# Patient Record
Sex: FEMALE | Race: WHITE | NOT HISPANIC OR LATINO | Employment: FULL TIME | URBAN - METROPOLITAN AREA
[De-identification: names, ages, dates, MRNs, and addresses within clinical notes are randomized per-mention and may not be internally consistent; named-entity substitution may affect disease eponyms.]

---

## 2017-07-16 ENCOUNTER — TRANSFERRED RECORDS (OUTPATIENT)
Dept: HEALTH INFORMATION MANAGEMENT | Facility: CLINIC | Age: 42
End: 2017-07-16

## 2017-11-22 ENCOUNTER — HOSPITAL ENCOUNTER (OUTPATIENT)
Dept: MAMMOGRAPHY | Facility: CLINIC | Age: 42
Discharge: HOME | End: 2017-11-22
Attending: OBSTETRICS & GYNECOLOGY | Admitting: OBSTETRICS & GYNECOLOGY
Payer: COMMERCIAL

## 2017-11-22 DIAGNOSIS — Z12.31 ENCOUNTER FOR SCREENING MAMMOGRAM FOR HIGH-RISK PATIENT: ICD-10-CM

## 2017-11-22 PROCEDURE — G0202 SCR MAMMO BI INCL CAD: HCPCS

## 2017-11-29 ENCOUNTER — HOSPITAL ENCOUNTER (OUTPATIENT)
Dept: ULTRASOUND IMAGING | Facility: CLINIC | Age: 42
Discharge: HOME | End: 2017-11-29
Attending: OBSTETRICS & GYNECOLOGY | Admitting: OBSTETRICS & GYNECOLOGY
Payer: COMMERCIAL

## 2017-11-29 DIAGNOSIS — R92.8 ABNORMAL MAMMOGRAM: ICD-10-CM

## 2017-11-29 PROCEDURE — 76642 ULTRASOUND BREAST LIMITED: CPT | Mod: LT

## 2018-11-27 ENCOUNTER — HOSPITAL ENCOUNTER (OUTPATIENT)
Dept: MAMMOGRAPHY | Facility: CLINIC | Age: 43
Discharge: HOME | End: 2018-11-27
Attending: OBSTETRICS & GYNECOLOGY | Admitting: OBSTETRICS & GYNECOLOGY
Payer: COMMERCIAL

## 2018-11-27 DIAGNOSIS — Z12.31 VISIT FOR SCREENING MAMMOGRAM: ICD-10-CM

## 2018-11-27 PROCEDURE — 77063 BREAST TOMOSYNTHESIS BI: CPT

## 2019-09-12 ENCOUNTER — HOSPITAL ENCOUNTER (OUTPATIENT)
Dept: MAMMOGRAPHY | Facility: CLINIC | Age: 44
Discharge: HOME | End: 2019-09-12
Attending: OBSTETRICS & GYNECOLOGY | Admitting: OBSTETRICS & GYNECOLOGY
Payer: COMMERCIAL

## 2019-09-12 ENCOUNTER — HOSPITAL ENCOUNTER (OUTPATIENT)
Dept: ULTRASOUND IMAGING | Facility: CLINIC | Age: 44
End: 2019-09-12
Attending: OBSTETRICS & GYNECOLOGY
Payer: COMMERCIAL

## 2019-09-12 DIAGNOSIS — N63.0 BREAST LUMP IN UPPER INNER QUADRANT: ICD-10-CM

## 2019-09-12 PROCEDURE — 77066 DX MAMMO INCL CAD BI: CPT

## 2019-09-12 PROCEDURE — G0279 TOMOSYNTHESIS, MAMMO: HCPCS

## 2019-09-12 PROCEDURE — 76642 ULTRASOUND BREAST LIMITED: CPT | Mod: LT

## 2019-12-15 ENCOUNTER — HEALTH MAINTENANCE LETTER (OUTPATIENT)
Age: 44
End: 2019-12-15

## 2020-12-22 ENCOUNTER — HOSPITAL ENCOUNTER (OUTPATIENT)
Dept: MAMMOGRAPHY | Facility: CLINIC | Age: 45
End: 2020-12-22
Attending: OBSTETRICS & GYNECOLOGY
Payer: COMMERCIAL

## 2020-12-22 ENCOUNTER — HOSPITAL ENCOUNTER (OUTPATIENT)
Dept: ULTRASOUND IMAGING | Facility: CLINIC | Age: 45
End: 2020-12-22
Attending: OBSTETRICS & GYNECOLOGY
Payer: COMMERCIAL

## 2020-12-22 DIAGNOSIS — N64.52 NIPPLE DISCHARGE: ICD-10-CM

## 2020-12-22 PROCEDURE — 77066 DX MAMMO INCL CAD BI: CPT

## 2020-12-22 PROCEDURE — 76642 ULTRASOUND BREAST LIMITED: CPT | Mod: RT

## 2021-01-15 ENCOUNTER — HEALTH MAINTENANCE LETTER (OUTPATIENT)
Age: 46
End: 2021-01-15

## 2021-01-19 ENCOUNTER — TRANSFERRED RECORDS (OUTPATIENT)
Dept: HEALTH INFORMATION MANAGEMENT | Facility: CLINIC | Age: 46
End: 2021-01-19

## 2021-01-19 LAB
HPV ABSTRACT: NORMAL
PAP-ABSTRACT: NORMAL

## 2021-02-10 ENCOUNTER — TELEPHONE (OUTPATIENT)
Dept: MAMMOGRAPHY | Facility: CLINIC | Age: 46
End: 2021-02-10

## 2021-02-10 ENCOUNTER — HOSPITAL ENCOUNTER (OUTPATIENT)
Dept: MRI IMAGING | Facility: CLINIC | Age: 46
Discharge: HOME | End: 2021-02-10
Attending: OBSTETRICS & GYNECOLOGY | Admitting: OBSTETRICS & GYNECOLOGY
Payer: COMMERCIAL

## 2021-02-10 DIAGNOSIS — Z11.59 ENCOUNTER FOR SCREENING FOR OTHER VIRAL DISEASES: ICD-10-CM

## 2021-02-10 DIAGNOSIS — N64.52 DISCHARGE FROM RIGHT NIPPLE: ICD-10-CM

## 2021-02-10 PROCEDURE — 255N000002 HC RX 255 OP 636: Performed by: RADIOLOGY

## 2021-02-10 PROCEDURE — 77049 MRI BREAST C-+ W/CAD BI: CPT

## 2021-02-10 PROCEDURE — A9585 GADOBUTROL INJECTION: HCPCS | Performed by: RADIOLOGY

## 2021-02-10 RX ORDER — GADOBUTROL 604.72 MG/ML
7.5 INJECTION INTRAVENOUS ONCE
Status: COMPLETED | OUTPATIENT
Start: 2021-02-10 | End: 2021-02-10

## 2021-02-10 RX ADMIN — GADOBUTROL 6 ML: 604.72 INJECTION INTRAVENOUS at 10:41

## 2021-02-10 NOTE — TELEPHONE ENCOUNTER
Called patient to go over MRI results and recommendations.  Patient has already reviewed report on [employer].   Discussed findings and recommendations to return to clinic for US and possible biopsy.  Explained if no area is seen on US at that time MRI guided biopsy would be recommended and that would then be schedule for a later date.  Patient voices understanding that she will need covid test prior.

## 2021-02-14 ENCOUNTER — HOSPITAL ENCOUNTER (OUTPATIENT)
Dept: LAB | Facility: CLINIC | Age: 46
Discharge: HOME | End: 2021-02-14
Attending: OBSTETRICS & GYNECOLOGY | Admitting: OBSTETRICS & GYNECOLOGY
Payer: COMMERCIAL

## 2021-02-14 DIAGNOSIS — Z11.59 ENCOUNTER FOR SCREENING FOR OTHER VIRAL DISEASES: ICD-10-CM

## 2021-02-14 LAB
LABORATORY COMMENT REPORT: NORMAL
SARS-COV-2 RNA RESP QL NAA+PROBE: NEGATIVE
SARS-COV-2 RNA RESP QL NAA+PROBE: NORMAL
SPECIMEN SOURCE: NORMAL
SPECIMEN SOURCE: NORMAL

## 2021-02-14 PROCEDURE — U0005 INFEC AGEN DETEC AMPLI PROBE: HCPCS | Performed by: OBSTETRICS & GYNECOLOGY

## 2021-02-14 PROCEDURE — U0003 INFECTIOUS AGENT DETECTION BY NUCLEIC ACID (DNA OR RNA); SEVERE ACUTE RESPIRATORY SYNDROME CORONAVIRUS 2 (SARS-COV-2) (CORONAVIRUS DISEASE [COVID-19]), AMPLIFIED PROBE TECHNIQUE, MAKING USE OF HIGH THROUGHPUT TECHNOLOGIES AS DESCRIBED BY CMS-2020-01-R: HCPCS | Performed by: OBSTETRICS & GYNECOLOGY

## 2021-02-17 ENCOUNTER — HOSPITAL ENCOUNTER (OUTPATIENT)
Dept: MAMMOGRAPHY | Facility: CLINIC | Age: 46
End: 2021-02-17
Attending: OBSTETRICS & GYNECOLOGY
Payer: COMMERCIAL

## 2021-02-17 ENCOUNTER — HOSPITAL ENCOUNTER (OUTPATIENT)
Dept: ULTRASOUND IMAGING | Facility: CLINIC | Age: 46
End: 2021-02-17
Attending: OBSTETRICS & GYNECOLOGY
Payer: COMMERCIAL

## 2021-02-17 ENCOUNTER — TRANSFERRED RECORDS (OUTPATIENT)
Dept: HEALTH INFORMATION MANAGEMENT | Facility: CLINIC | Age: 46
End: 2021-02-17

## 2021-02-17 DIAGNOSIS — Z12.31 VISIT FOR SCREENING MAMMOGRAM: ICD-10-CM

## 2021-02-17 PROCEDURE — 88377 M/PHMTRC ALYS ISHQUANT/SEMIQ: CPT | Mod: 26 | Performed by: PATHOLOGY

## 2021-02-17 PROCEDURE — 76642 ULTRASOUND BREAST LIMITED: CPT | Mod: RT

## 2021-02-17 PROCEDURE — 88377 M/PHMTRC ALYS ISHQUANT/SEMIQ: CPT | Mod: TC | Performed by: PATHOLOGY

## 2021-02-17 PROCEDURE — 88305 TISSUE EXAM BY PATHOLOGIST: CPT | Mod: 26 | Performed by: PATHOLOGY

## 2021-02-17 PROCEDURE — 88305 TISSUE EXAM BY PATHOLOGIST: CPT | Mod: TC | Performed by: OBSTETRICS & GYNECOLOGY

## 2021-02-17 PROCEDURE — 999N001019 HC STATISTIC H-FISH PROCESS B/S: Performed by: OBSTETRICS & GYNECOLOGY

## 2021-02-17 PROCEDURE — 999N000065 MA POST PROCEDURE RIGHT

## 2021-02-17 PROCEDURE — 88360 TUMOR IMMUNOHISTOCHEM/MANUAL: CPT | Mod: TC | Performed by: OBSTETRICS & GYNECOLOGY

## 2021-02-17 PROCEDURE — 88360 TUMOR IMMUNOHISTOCHEM/MANUAL: CPT | Mod: 26 | Performed by: PATHOLOGY

## 2021-02-17 PROCEDURE — 88342 IMHCHEM/IMCYTCHM 1ST ANTB: CPT | Mod: TC,XU | Performed by: OBSTETRICS & GYNECOLOGY

## 2021-02-17 PROCEDURE — 272N000031 US BREAST BIOPSY CORE NEEDLE RIGHT

## 2021-02-17 PROCEDURE — 999N001020 HC STATISTIC H-SEND OUTS PREP: Performed by: OBSTETRICS & GYNECOLOGY

## 2021-02-17 PROCEDURE — 250N000009 HC RX 250: Performed by: OBSTETRICS & GYNECOLOGY

## 2021-02-17 RX ADMIN — LIDOCAINE HYDROCHLORIDE 5 ML: 10 INJECTION, SOLUTION EPIDURAL; INFILTRATION; INTRACAUDAL; PERINEURAL at 10:57

## 2021-02-17 NOTE — DISCHARGE INSTRUCTIONS
Page 1 of 1  For informational purposes only. Not to replace the advice of your health care provider. Copyright   2010 [facility]. All rights reserved. Petta [id number] - REV 02/16.  After Your Breast Biopsy   Bleeding or bruising  Slight bruising is normal. If you bleed through the bandage, put direct pressure on the breast for 10 minutes.   If the breast begins to swell, or you have a lot of bleeding after 10 minutes of pressure, call the doctor who ordered your exam. Or, go to the emergency room.   Bandages  Keep your bandage in place until tomorrow morning. Do not get it wet.   If you have small pieces of tape on the skin, leave them in place. They will fall off on their own, or you can remove them after 5 days.   Activity  You may shower the morning after the exam. No heavy activity (lifting, vacuuming) on the day of your exam. You may go back to normal activity the next day, unless you had a lot of bleeding or pain.  Discomfort  You may take Tylenol (acetaminophen) today for pain. Tomorrow, you may take an anti-inflammatory medicine (aspirin, ibuprofen, Motrin, Aleve, Advil), unless your doctor tells you not to.  Wear your bra overnight to support the breast. You may also use an ice pack: Place it over the area for 15-20 minutes several times a day.  Infection  Infection is rare. Symptoms include fever, redness, increasing pain and fluid draining from the biopsy site. If you have any of these symptoms, please call the doctor who ordered your exam.  Results  Results may take up to 5 business days. A nurse or doctor from the Breast Center will call with your results. We will also send the results to the doctor who ordered your biopsy.  If you have not heard your results in 5 days, please call the Breast Center.   Other instructions  ______________________________________________________________________________________________________________________________  Call your doctor if:    You have  bleeding that lasts more than 10 minutes.    You have pain that cannot be controlled.   You have signs of infection (fever, redness, drainage or other signs).   You have not received your results within 5 days.    Please call the Breast Center nurse navigator at [phone] if you have questions or concerns about your biopsy.

## 2021-02-18 ENCOUNTER — TELEPHONE (OUTPATIENT)
Dept: MAMMOGRAPHY | Facility: CLINIC | Age: 46
End: 2021-02-18

## 2021-02-18 NOTE — TELEPHONE ENCOUNTER
Pathology report reviewed with our breast radiologist Dr Easton, who confirmed the recent breast imaging is concordant with the final surgical pathology results below.    I phoned Ms. Gurrola, confirmed her full name, date of birth, and notified patient of Ultrasound Guided right Breast Biopsy results showing Invasive ductal carcinoma, [place] grade 1 of 3.   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   - Scattered microcalcifications present.       Patient states no problems with biopsy site.  Recommended follow up is surgical consult.   Surgical Consult has been arranged with Dr Healy on 2-19-21 at 2pm.   Patient has directions and phone numbers.    Questions were answered and I explained my role as Breast Care Nurse Coordinator in assisting her with appointments, resources and social support.  New diagnosis information packet will be available for patient at surgical consult.  I will follow up with the patient. She has my phone number if she has further questions.  Patient verbalized understanding and agrees with the plan of care.  Ordering provider- Dr Rivas has been notified of the results, recommendations for follow up and scheduled surgical consultation.  I will forward this note, along with the pathology results.      Nemo Herbert RN CBCN  Breast Care Nurse Coordinator  [facility]  [phone]

## 2021-02-19 ENCOUNTER — OFFICE VISIT (OUTPATIENT)
Dept: SURGERY | Facility: CLINIC | Age: 46
End: 2021-02-19
Payer: COMMERCIAL

## 2021-02-19 VITALS
RESPIRATION RATE: 16 BRPM | HEART RATE: 70 BPM | WEIGHT: 153 LBS | DIASTOLIC BLOOD PRESSURE: 70 MMHG | BODY MASS INDEX: 23.19 KG/M2 | OXYGEN SATURATION: 99 % | SYSTOLIC BLOOD PRESSURE: 110 MMHG | HEIGHT: 68 IN

## 2021-02-19 DIAGNOSIS — C50.911 MALIGNANT NEOPLASM OF RIGHT BREAST (H): Primary | ICD-10-CM

## 2021-02-19 DIAGNOSIS — C50.911 BREAST CANCER, STAGE 1, ESTROGEN RECEPTOR POSITIVE, RIGHT (H): Primary | ICD-10-CM

## 2021-02-19 DIAGNOSIS — Z17.0 BREAST CANCER, STAGE 1, ESTROGEN RECEPTOR POSITIVE, RIGHT (H): Primary | ICD-10-CM

## 2021-02-19 PROCEDURE — 99204 OFFICE O/P NEW MOD 45 MIN: CPT | Performed by: SURGERY

## 2021-02-19 ASSESSMENT — MIFFLIN-ST. JEOR: SCORE: 1382.5

## 2021-02-19 NOTE — PROGRESS NOTES
CC: right breast cancer, nipple discharge.    HPI:  Right breast mass noted on a recent MRI in the course of a workup for spontaneous nipple discharge. She states that she had four occasions since last October when this occurred, always in a small amount. She noted no other changes in her breast health, no pain or lumps. She has had a prior imaging workup for left sided lump about two years ago.  Does perform self breast exams.  Previous breast biopsies: No  Previous cyst aspiration: No    Family history of breast cancer: No  Family history of ovarian cancer:  No    MRI personally reviewed; 1.3cm enhancing mass with irregular margins at 9:00 o'clock laterally; there is also dense liquid within multiple ducts on the right (also slightly notable on the left).    US reveals: a nodule at the corresponding MR site with similar dimesions.     Percutaneous core needle biopsy reveals: invasive ductal carcinoma, grade I, ER +, AK +, [id number] pending.     PE:  Vitals: There were no vitals taken for this visit.  General appearance: well-nourished, sitting comfortably, no apparent distress  Neck: Supple without thyromegaly, lymphadenopathy, masses  Lungs: Respirations unlabored  Abdomen: soft, nondistended  Extremities: Without edema  Neurologic: nonfocal, grossly intact times four extremities, alert and oriented times three  Psychiatric: Mood and affect are appropriate  Skin: Without lesions or rashes  Breast:     Masses- right - 2 cm diameter in region of ecchymosis   Ecchymosis- right lateral breast   Incisional scar- none    Axillae:   Palpable adenopathy: none    Plan:  Discussed in general terms breast conservation and mastectomy.    Discussed sentinel lymph node biopsy and possible axillary dissection.  Breast MRI: Yes; already performed  Oncology consult: Yes   Plastic surgery consult: Yes, would consider reconstruction if she decided to undergo a mastectomy.  Radiation oncology consult: pending final surgical decision  making.    Will tentatively schedule for lumpectomy and sentinel node biopsy. She understands that this does not directly address the nipple discharge but also that there is no clear underlying concern in that regard. We will talk further next week.    Kale Healy MD      Please route or send letter to:  Referring Provider

## 2021-02-19 NOTE — LETTER
2021    RE: Niki Gurrola,  : 1975    CC: right breast cancer, nipple discharge.     HPI:  Right breast mass noted on a recent MRI in the course of a workup for spontaneous nipple discharge. She states that she had four occasions since last October when this occurred, always in a small amount. She noted no other changes in her breast health, no pain or lumps. She has had a prior imaging workup for left sided lump about two years ago.  Does perform self breast exams.  Previous breast biopsies: No  Previous cyst aspiration: No     Family history of breast cancer: No  Family history of ovarian cancer:  No     MRI personally reviewed; 1.3cm enhancing mass with irregular margins at 9:00 o'clock laterally; there is also dense liquid within multiple ducts on the right (also slightly notable on the left).     US reveals: a nodule at the corresponding MR site with similar dimesions.      Percutaneous core needle biopsy reveals: invasive ductal carcinoma, grade I, ER +, [place] +, [id number] pending.      PE:  Vitals: There were no vitals taken for this visit.  General appearance: well-nourished, sitting comfortably, no apparent distress  Neck: Supple without thyromegaly, lymphadenopathy, masses  Lungs: Respirations unlabored  Abdomen: soft, nondistended  Extremities: Without edema  Neurologic: nonfocal, grossly intact times four extremities, alert and oriented times three  Psychiatric: Mood and affect are appropriate  Skin: Without lesions or rashes  Breast:                Masses- right - 2 cm diameter in region of ecchymosis              Ecchymosis- right lateral breast              Incisional scar- none     Axillae:   Palpable adenopathy: none     Plan:  Discussed in general terms breast conservation and mastectomy.    Discussed sentinel lymph node biopsy and possible axillary dissection.  Breast MRI: Yes; already performed  Oncology consult: Yes   Plastic surgery consult: Yes, would consider reconstruction  if she decided to undergo a mastectomy.  Radiation oncology consult: pending final surgical decision making.     Will tentatively schedule for lumpectomy and sentinel node biopsy. She understands that this does not directly address the nipple discharge but also that there is no clear underlying concern in that regard. We will talk further next week.     Kale Healy MD

## 2021-02-19 NOTE — PROGRESS NOTES
Breast Patients      BREAST PATIENTS (FEMALE)    At what age did your periods begin? 10    What was the date of your last menstrual period? 02/05/21    Have you begun menopause? No    Are you using hormone replacement therapy?  No    Number of full-term pregnancies: 2    Did you nurse your children? Yes    Are you pregnant now? No    Do you have breast implants? No         BREAST PATIENTS (ALL)    Have you had a previous breast biopsy? Yes  Side: R  Date: 02/17    Have you had previous Breast Cancer? No

## 2021-02-22 LAB — COPATH REPORT: NORMAL

## 2021-02-23 ENCOUNTER — HOSPITAL ENCOUNTER (OUTPATIENT)
Dept: GENERAL RADIOLOGY | Facility: CLINIC | Age: 46
End: 2021-02-23
Attending: SURGERY
Payer: COMMERCIAL

## 2021-02-23 ENCOUNTER — TRANSFERRED RECORDS (OUTPATIENT)
Dept: SURGERY | Facility: CLINIC | Age: 46
End: 2021-02-23

## 2021-02-23 ENCOUNTER — HOSPITAL ENCOUNTER (OUTPATIENT)
Dept: LAB | Facility: CLINIC | Age: 46
End: 2021-02-23
Attending: SURGERY
Payer: COMMERCIAL

## 2021-02-23 DIAGNOSIS — C50.911 MALIGNANT NEOPLASM OF RIGHT BREAST (H): ICD-10-CM

## 2021-02-23 LAB
ALBUMIN SERPL-MCNC: 3.4 G/DL (ref 3.4–5)
ALP SERPL-CCNC: 52 U/L (ref 40–150)
ALT SERPL W P-5'-P-CCNC: 19 U/L (ref 0–50)
ANION GAP SERPL CALCULATED.3IONS-SCNC: 4 MMOL/L (ref 3–14)
AST SERPL W P-5'-P-CCNC: 14 U/L (ref 0–45)
BASOPHILS # BLD AUTO: 0.1 10E9/L (ref 0–0.2)
BASOPHILS NFR BLD AUTO: 0.8 %
BILIRUB SERPL-MCNC: 0.4 MG/DL (ref 0.2–1.3)
BUN SERPL-MCNC: 11 MG/DL (ref 7–30)
CALCIUM SERPL-MCNC: 8.6 MG/DL (ref 8.5–10.1)
CHLORIDE SERPL-SCNC: 106 MMOL/L (ref 94–109)
CO2 SERPL-SCNC: 28 MMOL/L (ref 20–32)
COPATH REPORT: NORMAL
CREAT SERPL-MCNC: 0.67 MG/DL (ref 0.52–1.04)
DIFFERENTIAL METHOD BLD: NORMAL
EOSINOPHIL # BLD AUTO: 0.1 10E9/L (ref 0–0.7)
EOSINOPHIL NFR BLD AUTO: 0.8 %
ERYTHROCYTE [DISTWIDTH] IN BLOOD BY AUTOMATED COUNT: 12.6 % (ref 10–15)
GFR SERPL CREATININE-BSD FRML MDRD: >90 ML/MIN/{1.73_M2}
GLUCOSE SERPL-MCNC: 132 MG/DL (ref 70–99)
HCT VFR BLD AUTO: 39.7 % (ref 35–47)
HGB BLD-MCNC: 12.6 G/DL (ref 11.7–15.7)
IMM GRANULOCYTES # BLD: 0 10E9/L (ref 0–0.4)
IMM GRANULOCYTES NFR BLD: 0.2 %
LYMPHOCYTES # BLD AUTO: 2.4 10E9/L (ref 0.8–5.3)
LYMPHOCYTES NFR BLD AUTO: 27.1 %
MCH RBC QN AUTO: 29.9 PG (ref 26.5–33)
MCHC RBC AUTO-ENTMCNC: 31.7 G/DL (ref 31.5–36.5)
MCV RBC AUTO: 94 FL (ref 78–100)
MONOCYTES # BLD AUTO: 0.7 10E9/L (ref 0–1.3)
MONOCYTES NFR BLD AUTO: 8 %
NEUTROPHILS # BLD AUTO: 5.6 10E9/L (ref 1.6–8.3)
NEUTROPHILS NFR BLD AUTO: 63.1 %
NRBC # BLD AUTO: 0 10*3/UL
NRBC BLD AUTO-RTO: 0 /100
PLATELET # BLD AUTO: 260 10E9/L (ref 150–450)
POTASSIUM SERPL-SCNC: 3.2 MMOL/L (ref 3.4–5.3)
PROT SERPL-MCNC: 6.8 G/DL (ref 6.8–8.8)
RBC # BLD AUTO: 4.22 10E12/L (ref 3.8–5.2)
SODIUM SERPL-SCNC: 138 MMOL/L (ref 133–144)
WBC # BLD AUTO: 8.9 10E9/L (ref 4–11)

## 2021-02-23 PROCEDURE — 71046 X-RAY EXAM CHEST 2 VIEWS: CPT

## 2021-02-23 PROCEDURE — 85025 COMPLETE CBC W/AUTO DIFF WBC: CPT | Performed by: SURGERY

## 2021-02-23 PROCEDURE — 36415 COLL VENOUS BLD VENIPUNCTURE: CPT | Performed by: SURGERY

## 2021-02-23 PROCEDURE — 80053 COMPREHEN METABOLIC PANEL: CPT | Performed by: SURGERY

## 2021-02-24 ENCOUNTER — PREP FOR PROCEDURE (OUTPATIENT)
Dept: SURGERY | Facility: CLINIC | Age: 46
End: 2021-02-24

## 2021-02-24 ENCOUNTER — TELEPHONE (OUTPATIENT)
Dept: SURGERY | Facility: CLINIC | Age: 46
End: 2021-02-24

## 2021-02-24 DIAGNOSIS — C50.911 MALIGNANT NEOPLASM OF RIGHT BREAST (H): Primary | ICD-10-CM

## 2021-02-24 NOTE — TELEPHONE ENCOUNTER
Type of surgery:  RIGHT BREAST SEED LOCALIZED LUMPECTOMY, RIGHT AXILLARY SENTINEL NODE BIOPSY   Location of surgery: [facility]  Date and time of surgery: 4/7/2021 @ 10:00 AM   Surgeon: ANNE HUGHES MD    Pre-Op Appt Date: PATIENT TO SCHEDULE    Post-Op Appt Date: PATIENT TO SCHEDULE     Packet sent out: Yes  Pre-cert/Authorization completed:  Not Applicable  Date: 2/24/2021        RIGHT BREAST SEED LOCALIZED LUMPECTOMY, RIGHT AXILLARY SENTINEL NODE BIOPSY    GENERAL PT INST TO HAVE H&P WITH DPCP 120 MIN REQ PA ASSIST RMO NMS    SEED AT 8:00 SN INJECTION AT 9:00 NMS

## 2021-03-17 ENCOUNTER — OFFICE VISIT (OUTPATIENT)
Dept: FAMILY MEDICINE | Facility: CLINIC | Age: 46
End: 2021-03-17
Payer: COMMERCIAL

## 2021-03-17 VITALS
TEMPERATURE: 98.1 F | OXYGEN SATURATION: 98 % | HEART RATE: 69 BPM | SYSTOLIC BLOOD PRESSURE: 110 MMHG | BODY MASS INDEX: 25.65 KG/M2 | WEIGHT: 163.4 LBS | RESPIRATION RATE: 16 BRPM | DIASTOLIC BLOOD PRESSURE: 78 MMHG | HEIGHT: 67 IN

## 2021-03-17 DIAGNOSIS — C50.911 BREAST CANCER, STAGE 1, ESTROGEN RECEPTOR POSITIVE, RIGHT (H): ICD-10-CM

## 2021-03-17 DIAGNOSIS — Z17.0 BREAST CANCER, STAGE 1, ESTROGEN RECEPTOR POSITIVE, RIGHT (H): ICD-10-CM

## 2021-03-17 DIAGNOSIS — Z01.818 PREOP GENERAL PHYSICAL EXAM: Primary | ICD-10-CM

## 2021-03-17 PROCEDURE — 99203 OFFICE O/P NEW LOW 30 MIN: CPT | Performed by: INTERNAL MEDICINE

## 2021-03-17 ASSESSMENT — MIFFLIN-ST. JEOR: SCORE: 1413.81

## 2021-03-17 NOTE — PATIENT INSTRUCTIONS

## 2021-03-17 NOTE — H&P (VIEW-ONLY)
[facility]  [address] [address] [address]  Phone: [phone]  Primary Provider: [facility]  Pre-op Performing Provider: DARLING VALENZUELA    Pt is new to [facility]     PREOPERATIVE EVALUATION:  Today's date: 3/17/2021    Niki Gurrola is a 45 year old female who presents for a preoperative evaluation.    Surgical Information:  Surgery/Procedure: RIGHT BREAST SEED LOCALIZED LUMPECTOMY, RIGHT AXILLARY SENTINEL NODE BIOPSY  Surgery Location: [facility]  Surgeon: Kale Quarles MD  Surgery Date: 4/7/21  Time of Surgery: 10 am  Where patient plans to recover: At home with family  Fax number for surgical facility: Note does not need to be faxed, will be available electronically in Epic.    Type of Anesthesia Anticipated: General    Assessment & Plan     The proposed surgical procedure is considered LOW risk.    (Z01.818) Preop general physical exam  (primary encounter diagnosis)  Comment: right lumpectomy planned;   Plan: she will follow up with Oncology- Dr Galina Russo    (C50.911,  Z17.0) Breast cancer, stage 1, estrogen receptor positive, right (H)  Comment: presented with nipple d/c, Mammo and US neg, MRI +; BX: ER + , MD+, HER2-, planned R lumpectomy with anticipated XRT,Tamoxifen and defer other treatment options  Plan: defer final therapy decision post operatively to Oncology            Risks and Recommendations:  The patient has the following additional risks and recommendations for perioperative complications:   - No identified additional risk factors other than previously addressed    Medication Instructions:  Patient is on no chronic medications    RECOMMENDATION:  APPROVAL GIVEN to proceed with proposed procedure, without further diagnostic evaluation.    Review of external notes as documented above   Independent interpretation of a test performed by another physician/other qualified health care professional (not separately  reported) - reviewed [facility] note from Dr. Russo.      35 minutes spent on the date of the encounter doing chart review, review of outside records, review of test results, interpretation of tests, patient visit and documentation                     Subjective     HPI related to upcoming procedure: Niki Gurrola is a 45 year old female who presents with recent diagnosis of breast cancer and in need of surgical lumpectomy         Preop Questions 3/17/2021   1. Have you ever had a heart attack or stroke? No   2. Have you ever had surgery on your heart or blood vessels, such as a stent placement, a coronary artery bypass, or surgery on an artery in your head, neck, heart, or legs? No   3. Do you have chest pain with activity? No   4. Do you have a history of  heart failure? No   5. Do you currently have a cold, bronchitis or symptoms of other infection? No   6. Do you have a cough, shortness of breath, or wheezing? No   7. Do you or anyone in your family have previous history of blood clots? No   8. Do you or does anyone in your family have a serious bleeding problem such as prolonged bleeding following surgeries or cuts? No   9. Have you ever had problems with anemia or been told to take iron pills? No   10. Have you had any abnormal blood loss such as black, tarry or bloody stools, or abnormal vaginal bleeding? No   11. Have you ever had a blood transfusion? No   12. Are you willing to have a blood transfusion if it is medically needed before, during, or after your surgery? Yes- she thinks its a good idea   13. Have you or any of your relatives ever had problems with anesthesia? No   14. Do you have sleep apnea, excessive snoring or daytime drowsiness? No   15. Do you have any artifical heart valves or other implanted medical devices like a pacemaker, defibrillator, or continuous glucose monitor? No   16. Do you have artificial joints? No   17. Are you allergic to latex? No   18. Is there any chance that you may be  pregnant? No     Health Care Directive:  Patient does not have a Health Care Directive or Living Will: Discussed advance care planning with patient; however, patient declined at this time.    Preoperative Review of :   reviewed - no record of controlled substances prescribed.      Status of Chronic Conditions:  See problem list for active medical problems. No chronic health conditions other than recent diagnosis of breast cancer.       Review of Systems  CONSTITUTIONAL: NEGATIVE for fever, chills, change in weight  INTEGUMENTARY/SKIN: NEGATIVE for worrisome rashes, moles or lesions  EYES: NEGATIVE for vision changes or irritation  ENT/MOUTH: NEGATIVE for ear, mouth and throat problems  RESP: NEGATIVE for significant cough or SOB  BREAST: breast cancer diagnosed while evaluating nipple discharge. No family hx of breast cancer.  CV: NEGATIVE for chest pain, palpitations or peripheral edema  GI: NEGATIVE for nausea, abdominal pain, heartburn, or change in bowel habits  : NEGATIVE for frequency, dysuria, or hematuria  MUSCULOSKELETAL: NEGATIVE for significant arthralgias or myalgia  NEURO: NEGATIVE for weakness, dizziness or paresthesias  ENDOCRINE: NEGATIVE for temperature intolerance, skin/hair changes  HEME: NEGATIVE for bleeding problems  PSYCHIATRIC: NEGATIVE for changes in mood or affect    Patient Active Problem List    Diagnosis Date Noted     Malignant neoplasm of right breast (H) 2021     Priority: Medium     Added automatically from request for surgery [id number]        History reviewed. No pertinent past medical history.  Past Surgical History:   Procedure Laterality Date     C  DELIVERY ONLY      , Low Cervical     C/SECTION, LOW TRANSVERSE      , Low Transverse     ORTHOPEDIC SURGERY       SURGICAL HISTORY OF -       ACL Repair     SURGICAL HISTORY OF -       oral surgery, benign lesion     Current Outpatient Medications   Medication Sig Dispense Refill      "APRI 0.15-30 MG-MCG OR TABS 1 TABLET DAILY 28 0       Allergies   Allergen Reactions     No Known Drug Allergies         Social History     Tobacco Use     Smoking status: Never Smoker     Smokeless tobacco: Never Used   Substance Use Topics     Alcohol use: Yes     Comment: 2 drinks per week     Family History   Problem Relation Age of Onset     Lipids Maternal Grandmother      Hypertension Maternal Grandmother      Lipids Paternal Grandfather      Lipids Father      Cancer Paternal Grandmother         liver and colon     Lipids Paternal Grandmother         ??     Diabetes Paternal Grandmother         adult onset     History   Drug Use No         Objective     /78 (BP Location: Right arm, Patient Position: Sitting, Cuff Size: Adult Regular)   Pulse 69   Temp 98.1  F (36.7  C) (Oral)   Resp 16   Ht 1.702 m (5' 7\")   Wt 74.1 kg (163 lb 6.4 oz)   LMP 03/08/2021 (Approximate)   SpO2 98%   BMI 25.59 kg/m      Physical Exam    GENERAL APPEARANCE: healthy, alert and no distress     EYES: EOMI, PERRL     HENT: ear canals and TM's normal and nose and mouth without ulcers or lesions     NECK: no adenopathy, no asymmetry, masses, or scars and thyroid normal to palpation     RESP: lungs clear to auscultation - no rales, rhonchi or wheezes     BREAST: exam deferred to surgery ( done recently)     CV: regular rates and rhythm, normal S1 S2, no S3 or S4 and no murmur, click or rub     ABDOMEN:  soft, nontender, no HSM or masses and bowel sounds normal     MS: extremities normal- no gross deformities noted, no evidence of inflammation in joints, FROM in all extremities.     SKIN: no suspicious lesions or rashes     NEURO: Normal strength and tone, sensory exam grossly normal, mentation intact and speech normal     PSYCH: mentation appears normal. and affect normal/bright     LYMPHATICS: No cervical adenopathy    Recent Labs   Lab Test 02/23/21  1320   HGB 12.6         POTASSIUM 3.2*   CR 0.67    "     Diagnostics:  No results found for this or any previous visit (from the past 720 hour(s)).   No EKG required, no history of coronary heart disease, significant arrhythmia, peripheral arterial disease or other structural heart disease.    Revised Cardiac Risk Index (RCRI):  The patient has the following serious cardiovascular risks for perioperative complications:   - No serious cardiac risks = 0 points     RCRI Interpretation: 0 points: Class I (very low risk - 0.4% complication rate)           Signed Electronically by: MD Ayesha Valdez MD  Internal Medicine  electronically signed     Copy of this evaluation report is provided to requesting physician.

## 2021-03-17 NOTE — PROGRESS NOTES
[employer]  [address] [facility] [address]  Phone: [phone]  Primary Provider: [facility]  Pre-op Performing Provider: DARLING VALENZUELA    Pt is new to [facility]     PREOPERATIVE EVALUATION:  Today's date: 3/17/2021    Niki Gurrola is a 45 year old female who presents for a preoperative evaluation.    Surgical Information:  Surgery/Procedure: RIGHT BREAST SEED LOCALIZED LUMPECTOMY, RIGHT AXILLARY SENTINEL NODE BIOPSY  Surgery Location: [facility]  Surgeon: Kale Quarles MD  Surgery Date: 4/7/21  Time of Surgery: 10 am  Where patient plans to recover: At home with family  Fax number for surgical facility: Note does not need to be faxed, will be available electronically in Epic.    Type of Anesthesia Anticipated: General    Assessment & Plan     The proposed surgical procedure is considered LOW risk.    (Z01.818) Preop general physical exam  (primary encounter diagnosis)  Comment: right lumpectomy planned;   Plan: she will follow up with Oncology- Dr Galina Russo    (C50.911,  Z17.0) Breast cancer, stage 1, estrogen receptor positive, right (H)  Comment: presented with nipple d/c, Mammo and US neg, MRI +; BX: ER + , AR+, HER2-, planned R lumpectomy with anticipated XRT,Tamoxifen and defer other treatment options  Plan: defer final therapy decision post operatively to Oncology            Risks and Recommendations:  The patient has the following additional risks and recommendations for perioperative complications:   - No identified additional risk factors other than previously addressed    Medication Instructions:  Patient is on no chronic medications    RECOMMENDATION:  APPROVAL GIVEN to proceed with proposed procedure, without further diagnostic evaluation.    Review of external notes as documented above   Independent interpretation of a test performed by another physician/other qualified health care professional (not separately  reported) - reviewed [facility] note from Dr. Russo.      35 minutes spent on the date of the encounter doing chart review, review of outside records, review of test results, interpretation of tests, patient visit and documentation                     Subjective     HPI related to upcoming procedure: Niki Gurrola is a 45 year old female who presents with recent diagnosis of breast cancer and in need of surgical lumpectomy         Preop Questions 3/17/2021   1. Have you ever had a heart attack or stroke? No   2. Have you ever had surgery on your heart or blood vessels, such as a stent placement, a coronary artery bypass, or surgery on an artery in your head, neck, heart, or legs? No   3. Do you have chest pain with activity? No   4. Do you have a history of  heart failure? No   5. Do you currently have a cold, bronchitis or symptoms of other infection? No   6. Do you have a cough, shortness of breath, or wheezing? No   7. Do you or anyone in your family have previous history of blood clots? No   8. Do you or does anyone in your family have a serious bleeding problem such as prolonged bleeding following surgeries or cuts? No   9. Have you ever had problems with anemia or been told to take iron pills? No   10. Have you had any abnormal blood loss such as black, tarry or bloody stools, or abnormal vaginal bleeding? No   11. Have you ever had a blood transfusion? No   12. Are you willing to have a blood transfusion if it is medically needed before, during, or after your surgery? Yes- she thinks its a good idea   13. Have you or any of your relatives ever had problems with anesthesia? No   14. Do you have sleep apnea, excessive snoring or daytime drowsiness? No   15. Do you have any artifical heart valves or other implanted medical devices like a pacemaker, defibrillator, or continuous glucose monitor? No   16. Do you have artificial joints? No   17. Are you allergic to latex? No   18. Is there any chance that you may be  pregnant? No     Health Care Directive:  Patient does not have a Health Care Directive or Living Will: Discussed advance care planning with patient; however, patient declined at this time.    Preoperative Review of :   reviewed - no record of controlled substances prescribed.      Status of Chronic Conditions:  See problem list for active medical problems. No chronic health conditions other than recent diagnosis of breast cancer.       Review of Systems  CONSTITUTIONAL: NEGATIVE for fever, chills, change in weight  INTEGUMENTARY/SKIN: NEGATIVE for worrisome rashes, moles or lesions  EYES: NEGATIVE for vision changes or irritation  ENT/MOUTH: NEGATIVE for ear, mouth and throat problems  RESP: NEGATIVE for significant cough or SOB  BREAST: breast cancer diagnosed while evaluating nipple discharge. No family hx of breast cancer.  CV: NEGATIVE for chest pain, palpitations or peripheral edema  GI: NEGATIVE for nausea, abdominal pain, heartburn, or change in bowel habits  : NEGATIVE for frequency, dysuria, or hematuria  MUSCULOSKELETAL: NEGATIVE for significant arthralgias or myalgia  NEURO: NEGATIVE for weakness, dizziness or paresthesias  ENDOCRINE: NEGATIVE for temperature intolerance, skin/hair changes  HEME: NEGATIVE for bleeding problems  PSYCHIATRIC: NEGATIVE for changes in mood or affect    Patient Active Problem List    Diagnosis Date Noted     Malignant neoplasm of right breast (H) 2021     Priority: Medium     Added automatically from request for surgery [id number]        History reviewed. No pertinent past medical history.  Past Surgical History:   Procedure Laterality Date     C  DELIVERY ONLY      , Low Cervical     C/SECTION, LOW TRANSVERSE      , Low Transverse     ORTHOPEDIC SURGERY       SURGICAL HISTORY OF -       ACL Repair     SURGICAL HISTORY OF -       oral surgery, benign lesion     Current Outpatient Medications   Medication Sig Dispense Refill      "APRI 0.15-30 MG-MCG OR TABS 1 TABLET DAILY 28 0       Allergies   Allergen Reactions     No Known Drug Allergies         Social History     Tobacco Use     Smoking status: Never Smoker     Smokeless tobacco: Never Used   Substance Use Topics     Alcohol use: Yes     Comment: 2 drinks per week     Family History   Problem Relation Age of Onset     Lipids Maternal Grandmother      Hypertension Maternal Grandmother      Lipids Paternal Grandfather      Lipids Father      Cancer Paternal Grandmother         liver and colon     Lipids Paternal Grandmother         ??     Diabetes Paternal Grandmother         adult onset     History   Drug Use No         Objective     /78 (BP Location: Right arm, Patient Position: Sitting, Cuff Size: Adult Regular)   Pulse 69   Temp 98.1  F (36.7  C) (Oral)   Resp 16   Ht 1.702 m (5' 7\")   Wt 74.1 kg (163 lb 6.4 oz)   LMP 03/08/2021 (Approximate)   SpO2 98%   BMI 25.59 kg/m      Physical Exam    GENERAL APPEARANCE: healthy, alert and no distress     EYES: EOMI, PERRL     HENT: ear canals and TM's normal and nose and mouth without ulcers or lesions     NECK: no adenopathy, no asymmetry, masses, or scars and thyroid normal to palpation     RESP: lungs clear to auscultation - no rales, rhonchi or wheezes     BREAST: exam deferred to surgery ( done recently)     CV: regular rates and rhythm, normal S1 S2, no S3 or S4 and no murmur, click or rub     ABDOMEN:  soft, nontender, no HSM or masses and bowel sounds normal     MS: extremities normal- no gross deformities noted, no evidence of inflammation in joints, FROM in all extremities.     SKIN: no suspicious lesions or rashes     NEURO: Normal strength and tone, sensory exam grossly normal, mentation intact and speech normal     PSYCH: mentation appears normal. and affect normal/bright     LYMPHATICS: No cervical adenopathy    Recent Labs   Lab Test 02/23/21  1320   HGB 12.6         POTASSIUM 3.2*   CR 0.67    "     Diagnostics:  No results found for this or any previous visit (from the past 720 hour(s)).   No EKG required, no history of coronary heart disease, significant arrhythmia, peripheral arterial disease or other structural heart disease.    Revised Cardiac Risk Index (RCRI):  The patient has the following serious cardiovascular risks for perioperative complications:   - No serious cardiac risks = 0 points     RCRI Interpretation: 0 points: Class I (very low risk - 0.4% complication rate)           Signed Electronically by: MD Ayesha Valdez MD  Internal Medicine  electronically signed     Copy of this evaluation report is provided to requesting physician.

## 2021-03-23 DIAGNOSIS — Z11.59 ENCOUNTER FOR SCREENING FOR OTHER VIRAL DISEASES: ICD-10-CM

## 2021-04-05 DIAGNOSIS — Z11.59 ENCOUNTER FOR SCREENING FOR OTHER VIRAL DISEASES: ICD-10-CM

## 2021-04-05 LAB
SARS-COV-2 RNA RESP QL NAA+PROBE: NORMAL
SPECIMEN SOURCE: NORMAL

## 2021-04-05 PROCEDURE — U0005 INFEC AGEN DETEC AMPLI PROBE: HCPCS | Performed by: SURGERY

## 2021-04-05 PROCEDURE — U0003 INFECTIOUS AGENT DETECTION BY NUCLEIC ACID (DNA OR RNA); SEVERE ACUTE RESPIRATORY SYNDROME CORONAVIRUS 2 (SARS-COV-2) (CORONAVIRUS DISEASE [COVID-19]), AMPLIFIED PROBE TECHNIQUE, MAKING USE OF HIGH THROUGHPUT TECHNOLOGIES AS DESCRIBED BY CMS-2020-01-R: HCPCS | Performed by: SURGERY

## 2021-04-06 LAB
LABORATORY COMMENT REPORT: NORMAL
SARS-COV-2 RNA RESP QL NAA+PROBE: NEGATIVE
SPECIMEN SOURCE: NORMAL

## 2021-04-07 ENCOUNTER — ANESTHESIA (OUTPATIENT)
Dept: SURGERY | Facility: CLINIC | Age: 46
End: 2021-04-07
Payer: COMMERCIAL

## 2021-04-07 ENCOUNTER — HOSPITAL ENCOUNTER (OUTPATIENT)
Facility: CLINIC | Age: 46
Discharge: HOME | End: 2021-04-07
Attending: SURGERY | Admitting: SURGERY
Payer: COMMERCIAL

## 2021-04-07 ENCOUNTER — HOSPITAL ENCOUNTER (OUTPATIENT)
Dept: MAMMOGRAPHY | Facility: CLINIC | Age: 46
End: 2021-04-07
Attending: SURGERY | Admitting: SURGERY
Payer: COMMERCIAL

## 2021-04-07 ENCOUNTER — HOSPITAL ENCOUNTER (OUTPATIENT)
Dept: NUCLEAR MEDICINE | Facility: CLINIC | Age: 46
Setting detail: NUCLEAR MEDICINE
Discharge: HOME | End: 2021-04-07
Attending: SURGERY | Admitting: SURGERY
Payer: COMMERCIAL

## 2021-04-07 ENCOUNTER — APPOINTMENT (OUTPATIENT)
Dept: MAMMOGRAPHY | Facility: CLINIC | Age: 46
End: 2021-04-07
Attending: SURGERY
Payer: COMMERCIAL

## 2021-04-07 ENCOUNTER — ANESTHESIA EVENT (OUTPATIENT)
Dept: SURGERY | Facility: CLINIC | Age: 46
End: 2021-04-07
Payer: COMMERCIAL

## 2021-04-07 ENCOUNTER — APPOINTMENT (OUTPATIENT)
Dept: SURGERY | Facility: PHYSICIAN GROUP | Age: 46
End: 2021-04-07
Payer: COMMERCIAL

## 2021-04-07 ENCOUNTER — TRANSFERRED RECORDS (OUTPATIENT)
Dept: HEALTH INFORMATION MANAGEMENT | Facility: CLINIC | Age: 46
End: 2021-04-07

## 2021-04-07 ENCOUNTER — HOSPITAL ENCOUNTER (OUTPATIENT)
Dept: ULTRASOUND IMAGING | Facility: CLINIC | Age: 46
End: 2021-04-07
Attending: SURGERY | Admitting: SURGERY
Payer: COMMERCIAL

## 2021-04-07 VITALS
HEIGHT: 67 IN | SYSTOLIC BLOOD PRESSURE: 112 MMHG | HEART RATE: 67 BPM | WEIGHT: 162.3 LBS | TEMPERATURE: 98.5 F | BODY MASS INDEX: 25.47 KG/M2 | OXYGEN SATURATION: 99 % | RESPIRATION RATE: 12 BRPM | DIASTOLIC BLOOD PRESSURE: 66 MMHG

## 2021-04-07 DIAGNOSIS — C50.911 HORMONE RECEPTOR POSITIVE BREAST CANCER, RIGHT (H): Primary | ICD-10-CM

## 2021-04-07 DIAGNOSIS — C50.911 MALIGNANT NEOPLASM OF RIGHT BREAST (H): ICD-10-CM

## 2021-04-07 LAB — HCG UR QL: NEGATIVE

## 2021-04-07 PROCEDURE — 343N000001 HC RX 343: Performed by: SURGERY

## 2021-04-07 PROCEDURE — 81025 URINE PREGNANCY TEST: CPT | Performed by: ANESTHESIOLOGY

## 2021-04-07 PROCEDURE — 250N000009 HC RX 250: Performed by: RADIOLOGY

## 2021-04-07 PROCEDURE — 258N000003 HC RX IP 258 OP 636: Performed by: ANESTHESIOLOGY

## 2021-04-07 PROCEDURE — 88329 PATH CONSLTJ DRG SURG: CPT | Performed by: PATHOLOGY

## 2021-04-07 PROCEDURE — 250N000009 HC RX 250: Performed by: ANESTHESIOLOGY

## 2021-04-07 PROCEDURE — 370N000017 HC ANESTHESIA TECHNICAL FEE, PER MIN: Performed by: SURGERY

## 2021-04-07 PROCEDURE — 88307 TISSUE EXAM BY PATHOLOGIST: CPT | Mod: 26 | Performed by: PATHOLOGY

## 2021-04-07 PROCEDURE — A9520 TC99 TILMANOCEPT DIAG 0.5MCI: HCPCS | Performed by: SURGERY

## 2021-04-07 PROCEDURE — A4641 RADIOPHARM DX AGENT NOC: HCPCS

## 2021-04-07 PROCEDURE — 360N000082 HC SURGERY LEVEL 2 W/ FLUORO, PER MIN: Performed by: SURGERY

## 2021-04-07 PROCEDURE — 38792 RA TRACER ID OF SENTINL NODE: CPT

## 2021-04-07 PROCEDURE — 250N000011 HC RX IP 250 OP 636

## 2021-04-07 PROCEDURE — 999N000065 MA POST PROCEDURE RIGHT

## 2021-04-07 PROCEDURE — 710N000012 HC RECOVERY PHASE 2, PER MINUTE: Performed by: SURGERY

## 2021-04-07 PROCEDURE — 250N000011 HC RX IP 250 OP 636: Performed by: PHYSICIAN ASSISTANT

## 2021-04-07 PROCEDURE — 710N000009 HC RECOVERY PHASE 1, LEVEL 1, PER MIN: Performed by: SURGERY

## 2021-04-07 PROCEDURE — 88307 TISSUE EXAM BY PATHOLOGIST: CPT | Mod: TC | Performed by: SURGERY

## 2021-04-07 PROCEDURE — 250N000009 HC RX 250

## 2021-04-07 PROCEDURE — 999N000104 MA BREAST SPECIMEN RIGHT OR

## 2021-04-07 PROCEDURE — 250N000009 HC RX 250: Performed by: SURGERY

## 2021-04-07 PROCEDURE — 999N000141 HC STATISTIC PRE-PROCEDURE NURSING ASSESSMENT: Performed by: SURGERY

## 2021-04-07 PROCEDURE — 999N001011 HC STATISTIC COURTESY CONSULT: Performed by: SURGERY

## 2021-04-07 PROCEDURE — 272N000001 HC OR GENERAL SUPPLY STERILE: Performed by: SURGERY

## 2021-04-07 RX ORDER — NALOXONE HYDROCHLORIDE 0.4 MG/ML
0.4 INJECTION, SOLUTION INTRAMUSCULAR; INTRAVENOUS; SUBCUTANEOUS
Status: DISCONTINUED | OUTPATIENT
Start: 2021-04-07 | End: 2021-04-07 | Stop reason: HOSPADM

## 2021-04-07 RX ORDER — KETOROLAC TROMETHAMINE 30 MG/ML
INJECTION, SOLUTION INTRAMUSCULAR; INTRAVENOUS PRN
Status: DISCONTINUED | OUTPATIENT
Start: 2021-04-07 | End: 2021-04-07

## 2021-04-07 RX ORDER — ALBUTEROL SULFATE 0.83 MG/ML
2.5 SOLUTION RESPIRATORY (INHALATION) EVERY 4 HOURS PRN
Status: DISCONTINUED | OUTPATIENT
Start: 2021-04-07 | End: 2021-04-07 | Stop reason: HOSPADM

## 2021-04-07 RX ORDER — DIAZEPAM 10 MG/2ML
2.5 INJECTION, SOLUTION INTRAMUSCULAR; INTRAVENOUS
Status: DISCONTINUED | OUTPATIENT
Start: 2021-04-07 | End: 2021-04-07 | Stop reason: HOSPADM

## 2021-04-07 RX ORDER — ISOSULFAN BLUE 50 MG/5ML
20 INJECTION, SOLUTION SUBCUTANEOUS ONCE
Status: DISCONTINUED | OUTPATIENT
Start: 2021-04-07 | End: 2021-04-07 | Stop reason: HOSPADM

## 2021-04-07 RX ORDER — SODIUM CHLORIDE, SODIUM LACTATE, POTASSIUM CHLORIDE, CALCIUM CHLORIDE 600; 310; 30; 20 MG/100ML; MG/100ML; MG/100ML; MG/100ML
INJECTION, SOLUTION INTRAVENOUS CONTINUOUS
Status: DISCONTINUED | OUTPATIENT
Start: 2021-04-07 | End: 2021-04-07 | Stop reason: HOSPADM

## 2021-04-07 RX ORDER — FENTANYL CITRATE 50 UG/ML
25-50 INJECTION, SOLUTION INTRAMUSCULAR; INTRAVENOUS
Status: DISCONTINUED | OUTPATIENT
Start: 2021-04-07 | End: 2021-04-07 | Stop reason: HOSPADM

## 2021-04-07 RX ORDER — LIDOCAINE 40 MG/G
CREAM TOPICAL
Status: DISCONTINUED | OUTPATIENT
Start: 2021-04-07 | End: 2021-04-07 | Stop reason: HOSPADM

## 2021-04-07 RX ORDER — ACETAMINOPHEN 325 MG/1
650 TABLET ORAL ONCE
Status: DISCONTINUED | OUTPATIENT
Start: 2021-04-07 | End: 2021-04-07 | Stop reason: HOSPADM

## 2021-04-07 RX ORDER — FENTANYL CITRATE 50 UG/ML
INJECTION, SOLUTION INTRAMUSCULAR; INTRAVENOUS PRN
Status: DISCONTINUED | OUTPATIENT
Start: 2021-04-07 | End: 2021-04-07

## 2021-04-07 RX ORDER — DEXAMETHASONE SODIUM PHOSPHATE 4 MG/ML
INJECTION, SOLUTION INTRA-ARTICULAR; INTRALESIONAL; INTRAMUSCULAR; INTRAVENOUS; SOFT TISSUE PRN
Status: DISCONTINUED | OUTPATIENT
Start: 2021-04-07 | End: 2021-04-07

## 2021-04-07 RX ORDER — HYDRALAZINE HYDROCHLORIDE 20 MG/ML
2.5-5 INJECTION INTRAMUSCULAR; INTRAVENOUS EVERY 10 MIN PRN
Status: DISCONTINUED | OUTPATIENT
Start: 2021-04-07 | End: 2021-04-07 | Stop reason: HOSPADM

## 2021-04-07 RX ORDER — CEFAZOLIN SODIUM 2 G/100ML
2 INJECTION, SOLUTION INTRAVENOUS
Status: COMPLETED | OUTPATIENT
Start: 2021-04-07 | End: 2021-04-07

## 2021-04-07 RX ORDER — ONDANSETRON 2 MG/ML
4 INJECTION INTRAMUSCULAR; INTRAVENOUS EVERY 30 MIN PRN
Status: DISCONTINUED | OUTPATIENT
Start: 2021-04-07 | End: 2021-04-07 | Stop reason: HOSPADM

## 2021-04-07 RX ORDER — MEPERIDINE HYDROCHLORIDE 25 MG/ML
12.5 INJECTION INTRAMUSCULAR; INTRAVENOUS; SUBCUTANEOUS
Status: DISCONTINUED | OUTPATIENT
Start: 2021-04-07 | End: 2021-04-07 | Stop reason: HOSPADM

## 2021-04-07 RX ORDER — ONDANSETRON 4 MG/1
4 TABLET, ORALLY DISINTEGRATING ORAL EVERY 30 MIN PRN
Status: DISCONTINUED | OUTPATIENT
Start: 2021-04-07 | End: 2021-04-07 | Stop reason: HOSPADM

## 2021-04-07 RX ORDER — GLYCOPYRROLATE 0.2 MG/ML
INJECTION, SOLUTION INTRAMUSCULAR; INTRAVENOUS PRN
Status: DISCONTINUED | OUTPATIENT
Start: 2021-04-07 | End: 2021-04-07

## 2021-04-07 RX ORDER — HYDROCODONE BITARTRATE AND ACETAMINOPHEN 5; 325 MG/1; MG/1
1 TABLET ORAL
Status: DISCONTINUED | OUTPATIENT
Start: 2021-04-07 | End: 2021-04-07 | Stop reason: HOSPADM

## 2021-04-07 RX ORDER — NALOXONE HYDROCHLORIDE 0.4 MG/ML
0.2 INJECTION, SOLUTION INTRAMUSCULAR; INTRAVENOUS; SUBCUTANEOUS
Status: DISCONTINUED | OUTPATIENT
Start: 2021-04-07 | End: 2021-04-07 | Stop reason: HOSPADM

## 2021-04-07 RX ORDER — LABETALOL 20 MG/4 ML (5 MG/ML) INTRAVENOUS SYRINGE
10
Status: DISCONTINUED | OUTPATIENT
Start: 2021-04-07 | End: 2021-04-07 | Stop reason: HOSPADM

## 2021-04-07 RX ORDER — OXYCODONE HYDROCHLORIDE 5 MG/1
5 TABLET ORAL EVERY 4 HOURS PRN
Status: DISCONTINUED | OUTPATIENT
Start: 2021-04-07 | End: 2021-04-07 | Stop reason: HOSPADM

## 2021-04-07 RX ORDER — ONDANSETRON 2 MG/ML
INJECTION INTRAMUSCULAR; INTRAVENOUS PRN
Status: DISCONTINUED | OUTPATIENT
Start: 2021-04-07 | End: 2021-04-07

## 2021-04-07 RX ORDER — ISOSULFAN BLUE 50 MG/5ML
INJECTION, SOLUTION SUBCUTANEOUS PRN
Status: DISCONTINUED | OUTPATIENT
Start: 2021-04-07 | End: 2021-04-07 | Stop reason: HOSPADM

## 2021-04-07 RX ORDER — ISOSULFAN BLUE 50 MG/5ML
2 INJECTION, SOLUTION SUBCUTANEOUS ONCE
Status: DISCONTINUED | OUTPATIENT
Start: 2021-04-07 | End: 2021-04-07

## 2021-04-07 RX ORDER — PROPOFOL 10 MG/ML
INJECTION, EMULSION INTRAVENOUS PRN
Status: DISCONTINUED | OUTPATIENT
Start: 2021-04-07 | End: 2021-04-07

## 2021-04-07 RX ORDER — TAMSULOSIN HYDROCHLORIDE 0.4 MG/1
0.4 CAPSULE ORAL
Status: DISCONTINUED | OUTPATIENT
Start: 2021-04-07 | End: 2021-04-07 | Stop reason: HOSPADM

## 2021-04-07 RX ORDER — BUPIVACAINE HYDROCHLORIDE 5 MG/ML
INJECTION, SOLUTION EPIDURAL; INTRACAUDAL PRN
Status: DISCONTINUED | OUTPATIENT
Start: 2021-04-07 | End: 2021-04-07 | Stop reason: HOSPADM

## 2021-04-07 RX ORDER — KETOROLAC TROMETHAMINE 30 MG/ML
30 INJECTION, SOLUTION INTRAMUSCULAR; INTRAVENOUS EVERY 6 HOURS PRN
Status: DISCONTINUED | OUTPATIENT
Start: 2021-04-07 | End: 2021-04-07 | Stop reason: HOSPADM

## 2021-04-07 RX ORDER — CEFAZOLIN SODIUM 2 G/100ML
2 INJECTION, SOLUTION INTRAVENOUS SEE ADMIN INSTRUCTIONS
Status: DISCONTINUED | OUTPATIENT
Start: 2021-04-07 | End: 2021-04-07 | Stop reason: HOSPADM

## 2021-04-07 RX ORDER — HYDROCODONE BITARTRATE AND ACETAMINOPHEN 5; 325 MG/1; MG/1
1-2 TABLET ORAL EVERY 4 HOURS PRN
Qty: 10 TABLET | Refills: 0 | Status: SHIPPED | OUTPATIENT
Start: 2021-04-07 | End: 2021-04-22

## 2021-04-07 RX ADMIN — LIDOCAINE HYDROCHLORIDE 50 MG: 10 INJECTION, SOLUTION EPIDURAL; INFILTRATION; INTRACAUDAL; PERINEURAL at 09:51

## 2021-04-07 RX ADMIN — PROPOFOL 150 MG: 10 INJECTION, EMULSION INTRAVENOUS at 09:51

## 2021-04-07 RX ADMIN — CEFAZOLIN SODIUM 2 G: 2 INJECTION, SOLUTION INTRAVENOUS at 09:44

## 2021-04-07 RX ADMIN — GLYCOPYRROLATE 0.2 MG: 0.2 INJECTION, SOLUTION INTRAMUSCULAR; INTRAVENOUS at 10:22

## 2021-04-07 RX ADMIN — DEXAMETHASONE SODIUM PHOSPHATE 4 MG: 4 INJECTION, SOLUTION INTRA-ARTICULAR; INTRALESIONAL; INTRAMUSCULAR; INTRAVENOUS; SOFT TISSUE at 09:51

## 2021-04-07 RX ADMIN — SODIUM CHLORIDE, POTASSIUM CHLORIDE, SODIUM LACTATE AND CALCIUM CHLORIDE: 600; 310; 30; 20 INJECTION, SOLUTION INTRAVENOUS at 11:00

## 2021-04-07 RX ADMIN — KETOROLAC TROMETHAMINE 30 MG: 30 INJECTION, SOLUTION INTRAMUSCULAR at 10:32

## 2021-04-07 RX ADMIN — FENTANYL CITRATE 100 MCG: 50 INJECTION, SOLUTION INTRAMUSCULAR; INTRAVENOUS at 09:51

## 2021-04-07 RX ADMIN — MIDAZOLAM 2 MG: 1 INJECTION INTRAMUSCULAR; INTRAVENOUS at 09:44

## 2021-04-07 RX ADMIN — SODIUM CHLORIDE, POTASSIUM CHLORIDE, SODIUM LACTATE AND CALCIUM CHLORIDE: 600; 310; 30; 20 INJECTION, SOLUTION INTRAVENOUS at 08:54

## 2021-04-07 RX ADMIN — TILMANOCEPT 0.53 MCI.: KIT at 09:04

## 2021-04-07 RX ADMIN — SODIUM CHLORIDE, POTASSIUM CHLORIDE, SODIUM LACTATE AND CALCIUM CHLORIDE: 600; 310; 30; 20 INJECTION, SOLUTION INTRAVENOUS at 08:55

## 2021-04-07 RX ADMIN — ONDANSETRON HYDROCHLORIDE 4 MG: 2 INJECTION, SOLUTION INTRAVENOUS at 10:05

## 2021-04-07 RX ADMIN — LIDOCAINE HYDROCHLORIDE 5 ML: 10 INJECTION, SOLUTION EPIDURAL; INFILTRATION; INTRACAUDAL; PERINEURAL at 08:15

## 2021-04-07 ASSESSMENT — MIFFLIN-ST. JEOR: SCORE: 1408.82

## 2021-04-07 NOTE — ANESTHESIA POSTPROCEDURE EVALUATION
Patient: Niki Gurrola    Procedure(s):  RIGHT BREAST SEED LOCALIZED LUMPECTOMY, RIGHT AXILLARY SENTINEL NODE BIOPSY    Diagnosis:Malignant neoplasm of right breast (H) [C50.911]  Diagnosis Additional Information: No value filed.    Anesthesia Type:  General    Note:  Disposition: Outpatient   Postop Pain Control: Uneventful            Sign Out: Well controlled pain   PONV: No   Neuro/Psych: Uneventful            Sign Out: Acceptable/Baseline neuro status   Airway/Respiratory: Uneventful            Sign Out: Acceptable/Baseline resp. status   CV/Hemodynamics: Uneventful            Sign Out: Acceptable CV status   Other NRE: NONE   DID A NON-ROUTINE EVENT OCCUR? No         Last vitals:  Vitals:    04/07/21 1210 04/07/21 1228 04/07/21 1310   BP: 112/66 113/69 112/66   Pulse: 71 67 67   Resp: 15 12 12   Temp: 97.7  F (36.5  C) 98.7  F (37.1  C) 98.5  F (36.9  C)   SpO2: 99% 99%        Last vitals prior to Anesthesia Care Transfer:  CRNA VITALS  4/7/2021 1050 - 4/7/2021 1150      4/7/2021             Pulse:  105    SpO2:  99 %    Resp Rate (observed):  8          Electronically Signed By: Eddie Denton MD  April 7, 2021  2:57 PM

## 2021-04-07 NOTE — PROGRESS NOTES
530uCi Tc99m Lymphoseek injected in the RIGHT breast intradermally at the 10 o'clock position. Injected at 9:00am- DKS

## 2021-04-07 NOTE — OP NOTE
Procedure Date: 04/07/2021      PREOPERATIVE DIAGNOSIS:  Right-sided stage I breast cancer.      POSTOPERATIVE DIAGNOSIS:  Right-sided stage I breast cancer.      PROCEDURES:   1.  Right breast seed localized lumpectomy.   2.  Right axillary sentinel lymph node biopsy.      ANESTHESIA:  General plus local.      SURGEON:  Kale Healy MD      ASSISTANT:  Abimbola Heard PA-C.  A physician assistant was medically necessary for her skills in suturing, cutting the suture, exposure, traction, and suctioning throughout the operation.      SPECIMENS:   1.  Right breast seed localized lumpectomy, short stitch marked superior, long lateral margins.   2.  Honeydew lymph node #1.   3.  Honeydew lymph node #2.   4.  Honeydew lymph node #3.   5.  Honeydew lymph node #4.   6.  Honeydew lymph node from the right axilla.   7.  Reexcision superior margin, suture marks new margin.   8.  Reexcision lateral margin, suture marks new margin.      COMPLICATIONS:  None.      INDICATIONS:  Ms. Gurrola is a 45-year-old female who had a serendipitous finding of a right lateral breast mass in workup for spontaneous right-sided nipple discharge.  She went on to have a biopsy of this lesion which found a grade 1 ductal cancer.  After discussing all of her surgical options, the patient desired breast-conserving therapy.  Risks of the operation were outlined in detail.  These include infection, bleeding, harm to structures, need for further excision, lymphedema development as well as need for adjuvant treatment including radiation.  The patient verbalized understanding of the above and consented to proceed.      FINDINGS:  We performed a right breast seed localized lumpectomy based on our iodine-125 seed marker.  Radiograph showed the clip, seed and lesion within the specimen.  The patient's breast tissue was relatively dense and fibrous, making intraoperative gross consultation somewhat difficult, but the biopsy site was close to the superior  and lateral margins, which were therefore reexcised.  Four sentinel lymph nodes were removed, all of which were grossly normal and therefore submitted in formalin for routine handling.      DESCRIPTION OF PROCEDURE:  With the patient under excellent general anesthesia in supine position, the right breast and axilla were prepped and draped out with chlorhexidine in the usual fashion.  Timeout was then performed confirming the patient, procedure to be done as well as drug allergies and site markings.  She did receive a dose of Ancef for infection prophylaxis prior to making our initial incision.  We began by infiltrating the nipple-areolar complex with a total of 3 mL of isosulfan blue.  The breast was massaged for several minutes.  We set our gamma probe to iodine-125 and scanned the lateral edge of the breast.  A curvilinear 3 cm incision was then made over the site of maximum counts and skin flaps were raised for about 2 cm in all directions.  This allowed us to place an extra small Jer wound protector into the wound.  We then used the gamma probe to guide our dissection and created a lumpectomy specimen approximately 3 cm in diameter.  While still in vivo, this was marked with a short stitch superiorly and a long 1 laterally.  The lesion was then cut free from the deeper attachments and placed on the radiograph plate.  Intraoperative radiograph showed the clip and seed within the specimen.  This was then handled per protocol and subjected to a gross intraoperative consultation.        We then scanned our axillary region with gamma probe set to technetium 99.  A 2.5 cm oblique incision was made along Melissa's lines over the site of maximum counts.  Electrocautery and dissection were then carried out through the subcutaneous tissues until we entered the axillary fat.  A self-retaining cerebellar retractor was placed into the wound, we continued to use our gamma probe to guide us towards a blue node, which was hot  radioactive.  This was removed as were several blue nodes adjacent to it as well as an enlarged nonstained and nonradioactive note.  These nodes were all  on the Medina stand and the first had ex vivo counts of 436.  The remainder had single digit counts in the range of 5-8, but were blue staining.  They were all grossly normal and therefore submitted in formalin for routine handling.  A single enlarged node was not radioactive nor blue, but was submitted as nonsentinel node.        We infiltrated the axilla with 0.5% Marcaine, and closed the wound in multiple layers with 3-0 and 4-0 Vicryls.  At this juncture, our pathologist discussed the gross findings.  The lesion was difficult to identify given the density of the breast tissue, but by the biopsy site was close to the superior and lateral edges.  We reexcised each of these aspects of the lumpectomy site about 3-4 mm thick and marked the new margins with a suture and submitted them in formalin for routine handling.  Lumpectomy site was also infiltrated with 0.5% Marcaine and irrigated.  It was checked for hemostasis and thereafter small clips were placed superiorly, inferiorly, medially and laterally.  The lumpectomy site was then closed in multiple layers with 3-0 and 4-0 Vicryls.  Skin glue was then applied to both of the closed wounds.        The patient tolerated the procedure well.  She was extubated and brought to recovery in excellent condition.  All sharps and sponge counts were correct at the conclusion of the case.         ANNE CLOUD MD             D: 2021   T: 2021   MT: EDWIN      Name:     MIR PACHECO   MRN:      [id number]        Account:        [id number]   :      1975           Procedure Date: 2021      Document: [id number]       cc: Radha Gomez MD

## 2021-04-07 NOTE — PROGRESS NOTES
SBAR Seed Localization    SITUATION:  Patient to breast imaging center for imaging guided seed localizations before breast lumpectomy or excision biopsy with sentinel node injection.    BACKGROUND:  Breast imaging cancer, breast abnormality  Ordered procedure completed: Yes  Special needs identified: No     ASSESSMENT:  SBAR report called to patient care unit because of unexpected event in radiology: No  Allergies and medication list reviewed prior to procedure. Yes  Skin cleansed with ChloraPrep One-Step.  Anesthesia: approximately 5ml of 1% Lidocaine injection subcutaneous before seed insertion administered by the radiologist.   Gauze dressing over insertion site(s).  Post procedure mammogram completed: Yes    Patient tolerance: patient tolerated right breast ultrasound guided radioactive seed localization well.    RECOMMENDATIONS:  Patient transferred to [facility] Surgery in stable condition via wheelchair with Transport Services.    Please call [facility] [phone] if there are any questions.        [facility]  Procedure Note          Physician Sedation Documentation:       Pre-Procedure          Intra-Procedure          Post-Procedure

## 2021-04-07 NOTE — ANESTHESIA PREPROCEDURE EVALUATION
Anesthesia Pre-Procedure Evaluation    Patient: Niki Gurrola   MRN: [id number] : 1975        Preoperative Diagnosis: Malignant neoplasm of right breast (H) [C50.911]   Procedure : Procedure(s):  RIGHT BREAST SEED LOCALIZED LUMPECTOMY, RIGHT AXILLARY SENTINEL NODE BIOPSY     Past Medical History:   Diagnosis Date     Cancer (H)      Thyroid disease       Past Surgical History:   Procedure Laterality Date     C  DELIVERY ONLY      , Low Cervical     C/SECTION, LOW TRANSVERSE      , Low Transverse     ORTHOPEDIC SURGERY       SURGICAL HISTORY OF -       ACL Repair     SURGICAL HISTORY OF -       oral surgery, benign lesion      Allergies   Allergen Reactions     No Known Drug Allergies       Social History     Tobacco Use     Smoking status: Never Smoker     Smokeless tobacco: Never Used   Substance Use Topics     Alcohol use: Yes     Comment: 2 drinks per week      Wt Readings from Last 1 Encounters:   21 73.6 kg (162 lb 4.8 oz)        Anesthesia Evaluation   Pt has had prior anesthetic.         ROS/MED HX  ENT/Pulmonary:  - neg pulmonary ROS  (-) sleep apnea   Neurologic:       Cardiovascular:  - neg cardiovascular ROS     METS/Exercise Tolerance:     Hematologic:       Musculoskeletal:       GI/Hepatic:    (-) GERD   Renal/Genitourinary:       Endo:       Psychiatric/Substance Use:       Infectious Disease:       Malignancy:   (+) Malignancy, History of Breast.    Other:            Physical Exam    Airway        Mallampati: II   TM distance: > 3 FB   Neck ROM: full     Respiratory Devices and Support         Dental           Cardiovascular          Rhythm and rate: regular and normal     Pulmonary           breath sounds clear to auscultation           OUTSIDE LABS:  CBC:   Lab Results   Component Value Date    WBC 8.9 2021    WBC 9.2 2012    HGB 12.6 2021    HGB 11.6 (L) 2012    HCT 39.7 2021    HCT 35.4 2012      02/23/2021     04/06/2012     BMP:   Lab Results   Component Value Date     02/23/2021     04/06/2012    POTASSIUM 3.2 (L) 02/23/2021    POTASSIUM 3.8 04/06/2012    CHLORIDE 106 02/23/2021    CHLORIDE 104 04/06/2012    CO2 28 02/23/2021    CO2 25 04/06/2012    BUN 11 02/23/2021    BUN 11 04/06/2012    CR 0.67 02/23/2021    CR 0.72 04/06/2012     (H) 02/23/2021    GLC 72 04/06/2012     COAGS: No results found for: PTT, INR, FIBR  POC:   Lab Results   Component Value Date    HCG Negative 04/07/2021     HEPATIC:   Lab Results   Component Value Date    ALBUMIN 3.4 02/23/2021    PROTTOTAL 6.8 02/23/2021    ALT 19 02/23/2021    AST 14 02/23/2021    ALKPHOS 52 02/23/2021    BILITOTAL 0.4 02/23/2021     OTHER:   Lab Results   Component Value Date    PAM 8.6 02/23/2021       Anesthesia Plan    ASA Status:  3   NPO Status:  NPO Appropriate    Anesthesia Type: General.     - Airway: LMA   Induction: Intravenous.   Maintenance: Balanced.        Consents    Anesthesia Plan(s) and associated risks, benefits, and realistic alternatives discussed. Questions answered and patient/representative(s) expressed understanding.     - Discussed with:  Patient         Postoperative Care    Pain management: IV analgesics, Oral pain medications, Multi-modal analgesia.   PONV prophylaxis: Ondansetron (or other 5HT-3), Dexamethasone or Solumedrol     Comments:                Eddie Denton MD

## 2021-04-07 NOTE — ANESTHESIA CARE TRANSFER NOTE
Patient: Niki Gurrola    Procedure(s):  RIGHT BREAST SEED LOCALIZED LUMPECTOMY, RIGHT AXILLARY SENTINEL NODE BIOPSY    Diagnosis: Malignant neoplasm of right breast (H) [C50.911]  Diagnosis Additional Information: No value filed.    Anesthesia Type:   General     Note:    Oropharynx: spontaneously breathing  Level of Consciousness: awake  Oxygen Supplementation: face mask  Level of Supplemental Oxygen (L/min / FiO2): 6l  Independent Airway: airway patency satisfactory and stable  Dentition: dentition unchanged  Vital Signs Stable: post-procedure vital signs reviewed and stable  Report to RN Given: handoff report given  Patient transferred to: PACU  Comments: Pt to PACU, VSS, report to RN  Handoff Report: Identifed the Patient, Identified the Reponsible Provider, Reviewed the pertinent medical history, Discussed the surgical course, Reviewed Intra-OP anesthesia mangement and issues during anesthesia, Set expectations for post-procedure period and Allowed opportunity for questions and acknowledgement of understanding      Vitals: (Last set prior to Anesthesia Care Transfer)  CRNA VITALS  4/7/2021 1050 - 4/7/2021 1126      4/7/2021             Pulse:  105    SpO2:  99 %    Resp Rate (observed):  8        Electronically Signed By: ISERRA Osborn CRNA  April 7, 2021  11:26 AM

## 2021-04-07 NOTE — ANESTHESIA PROCEDURE NOTES
Airway       Patient location during procedure: OR       Procedure Start/Stop Times: 4/7/2021 9:51 AM  Staff -        Anesthesiologist:  Eddie Denton MD       CRNA: Miguel Henao APRN CRNA       Performed By: CRNA  Consent for Airway        Urgency: elective  Indications and Patient Condition       Indications for airway management: ivy-procedural       Induction type:intravenous       Mask difficulty assessment: 0 - not attempted    Final Airway Details       Final airway type: supraglottic airway    Supraglottic Airway Details        Type: LMA       Brand: I-Gel       LMA size: 4    Post intubation assessment        Placement verified by: capnometry, equal breath sounds and chest rise        Number of attempts at approach: 1       Number of other approaches attempted: 0       Secured with: plastic tape       Ease of procedure: easy       Dentition: Intact and Unchanged

## 2021-04-07 NOTE — DISCHARGE INSTRUCTIONS
You received Toradol, an IV form of ibuprofen (Motrin) at 10:30.  Do not take any ibuprofen products until 4:30.    GENERAL ANESTHESIA OR SEDATION ADULT DISCHARGE INSTRUCTIONS   SPECIAL PRECAUTIONS FOR 24 HOURS AFTER SURGERY    IT IS NOT UNUSUAL TO FEEL LIGHT-HEADED OR FAINT, UP TO 24 HOURS AFTER SURGERY OR WHILE TAKING PAIN MEDICATION.  IF YOU HAVE THESE SYMPTOMS; SIT FOR A FEW MINUTES BEFORE STANDING AND HAVE SOMEONE ASSIST YOU WHEN YOU GET UP TO WALK OR USE THE BATHROOM.    YOU SHOULD REST AND RELAX FOR THE NEXT 24 HOURS AND YOU MUST MAKE ARRANGEMENTS TO HAVE SOMEONE STAY WITH YOU FOR AT LEAST 24 HOURS AFTER YOUR DISCHARGE.  AVOID HAZARDOUS AND STRENUOUS ACTIVITIES.  DO NOT MAKE IMPORTANT DECISIONS FOR 24 HOURS.    DO NOT DRIVE ANY VEHICLE OR OPERATE MECHANICAL EQUIPMENT FOR 24 HOURS FOLLOWING THE END OF YOUR SURGERY.  EVEN THOUGH YOU MAY FEEL NORMAL, YOUR REACTIONS MAY BE AFFECTED BY THE MEDICATION YOU HAVE RECEIVED.    DO NOT DRINK ALCOHOLIC BEVERAGES FOR 24 HOURS FOLLOWING YOUR SURGERY.    DRINK CLEAR LIQUIDS (APPLE JUICE, GINGER ALE, 7-UP, BROTH, ETC.).  PROGRESS TO YOUR REGULAR DIET AS YOU FEEL ABLE.    YOU MAY HAVE A DRY MOUTH, A SORE THROAT, MUSCLES ACHES OR TROUBLE SLEEPING.  THESE SHOULD GO AWAY AFTER 24 HOURS.    CALL YOUR DOCTOR FOR ANY OF THE FOLLOWING:  SIGNS OF INFECTION (FEVER, GROWING TENDERNESS AT THE SURGERY SITE, A LARGE AMOUNT OF DRAINAGE OR BLEEDING, SEVERE PAIN, FOUL-SMELLING DRAINAGE, REDNESS OR SWELLING.    IT HAS BEEN OVER 8 TO 10 HOURS SINCE SURGERY AND YOU ARE STILL NOT ABLE TO URINATE (PASS WATER).     HOME CARE FOLLOWING LUMPECTOMY  CHIDI Parker, FRANNY Mcbride R. O Donnell, J. Shaheen    APPOINTMENT WITH YOUR SURGEON:  All patients are to schedule a post-op appointment with their general surgeon.  Once you are home, call the office to schedule the appointment for 2-3 weeks from the date of your surgery.  You may need to be seen sooner if you have a drain in  place.      Appointments to see your general surgeon at any of our locations can be made by calling:  #[phone]    You will receive a phone call from your surgeon with these results.  You will be able to ask questions and receive more in-depth explanation at your post-op appointment.  This appointment will also be when you discuss further evaluation, treatment, and referral to oncology and/or radiation oncology if this is necessary.  Occasionally special testing must be done on the surgical specimen which may delay the posting of your final pathology report.  You may call for your final pathology report after 1p.m. three working days after surgery to check on its status.    SUPPORT:  Wear a bra for support and comfort for 3-7 days, day and night.    DRAIN:  If you have a drain in place, you will need a separate appointment with a nurse in the office to have this removed.  You will have a form to keep track of the output of your drain.  When the output reaches a point where the total for a 24 hour period is less than 30cc s, you are ready to have the drain removed.  At that point you can call the office and talk to the nurse to arrange coming into the office to have this done.  If you have more than one drain in place, they may not all be removed at the same time, as the outputs can be very different from each.  The nurse will help you to manage this and help decide when the remaining drains will be taken out.    INCISIONAL CARE:    If you have a dressing in place, keep clean and dry for 48 hours; you may replace the gauze if it becomes soiled.    After 48 hours you may remove the dressing and shower.  Do not submerse incision in water for 1 week.    If you have a Dermabond dressing (a type of skin glue), you may shower immediately.    Sutures will absorb and do not need to be removed.    If present, leave the steri-strips (white paper tapes) in place for 14 days after surgery.    If present, leave Dermabond glue  in place until it wears/flakes off.    You may expect a small amount of drainage from your incision.    A lump/ridge under the incision is normal and will gradually resolve.    BATHING:  You are allowed to bath (shallow water in a tub only) or shower 24-48 hours after your surgery.  Mild soap is OK to use near these sites.  When bathing, do not allow the incision or drain site to become submersed in water as this may increase the risk of infection.    ACTIVITY:  Cautiously resume exercise and strenuous activities such as jogging, tennis, aerobics, etc. Also, be careful of stretching activities with operative side for two weeks.    If you had a  sentinel node biopsy  at the time of your surgery:  You have no restrictions in addition to those noted above.    If you had an  axillary node dissection  at the time of your surgery:  You are recommended to restrict the activity of the arm on the side the dissection was done on.  This means:  no reaching overhead until cleared to do so by your surgeon, no carrying weight on that side greater than a couple pounds, no injections/vaccinations/ blood samples from that side, and no blood pressure measurements on that side.  It is also recommended to elevate the arm on pillows several times a day to decrease/minimize swelling, and avoid sleeping on that arm.    DIET:  No restrictions.  Increased fluid intake is recommended. While taking pain medications, increase dietary fiber or add a fiber supplementation like Metamucil or Citrucel to help prevent constipation - a possible side effect of pain medications.    DISCOMFORT:  Local anesthetic placed at surgery should provide relief for 4-8 hours.  Begin taking pain pills before discomfort is severe.  Take the pain medication with some food, when possible, to minimize side effects.  Intermittent use of ice packs may help during the first 48 hours.  Expect gradual improvement.    RETURN APPOINTMENT:  Schedule a follow-up visit 2-3  weeks post-op.  Office Phone:  [phone]     CONTACT US IF THE FOLLOWING DEVELOPS:   1. A fever that is above 101     2. If there is a large amount of drainage, bleeding, or swelling.   3. Severe pain that is not relieved by your prescription.   4. Drainage that is thick, cloudy, yellow, green or white.   5. Any other questions not answered by  Frequently Asked Questions  sheet.      FREQUENTLY ASKED QUESTIONS:    Q:  How should my incision look?    A:  Normally your incision will appear slightly swollen with light redness directly along the incision itself as it heals.  It may feel like a bump or ridge as the healing/scarring happens, and over time (3-4 months) this bump or ridge feeling should slowly go away.  In general, clear or pink watery drainage can be normal at first as your incision heals, but should decrease over time.    Q:  How do I know if my incision is infected?  A:  Look at your incision for signs of infection, like redness around the incision spreading to surrounding skin, or drainage of cloudy or foul-smelling drainage.  If you feel warm, check your temperature to see if you are running a fever.    **If any of these things occur, please notify the nurse at our office.  We may need you to come into the office for an incision check.      Q:  How do I take care of my incision?  A:  If you have a dressing in place - Starting the day after surgery, replace the dressing 1-2 times a day until there is no further drainage from the incision.  At that time, a dressing is no longer needed.  Try to minimize tape on the skin if irritation is occurring at the tape sites.  If you have significant irritation from tape on the skin, please call the office to discuss other method of dressing your incision.    Small pieces of tape called  steri-strips  may be present directly overlying your incision; these may be removed 10 days after surgery unless otherwise specified by your surgeon.  If these tapes start to  loosen at the ends, you may trim them back until they fall off or are removed.    A:  If you had  Dermabond  tissue glue used as a dressing (this causes your incision to look shiny with a clear covering over it) - This type of dressing wears off with time and does not require more dressings over the top unless it is draining around the glue as it wears off.  Do not apply ointments or lotions over the incisions until the glue has completely worn off.    Q:  There is a piece of tape or a sticky  lead  still on my skin.  Can I remove this?  A:  Sometimes the sticky  leads  used for monitoring during surgery or for evaluation in the emergency department are not all removed while you are in the hospital.  These sometimes have a tab or metal dot on them.  You can easily remove these on your own, like taking off a band-aid.  If there is a gel substance under the  lead , simply wipe/clean it off with a washcloth or paper towel.      Q:  What can I do to minimize constipation (very hard stools, or lack of stools)?  A:  Stay well hydrated.  Increase your dietary fiber intake or take a fiber supplement -with plenty of water.  Walk around frequently.  You may consider an over-the-counter stool-softener.  Your Pharmacist can assist you with choosing one that is stocked at your pharmacy.  Constipation is also one of the most common side effects of pain medication.  If you are using pain medication, be pro-active and try to PREVENT problems with constipation by taking the steps above BEFORE constipation becomes a problem.    Q:  What do I do if I need more pain medications?  A:  Call the office to receive refills.  Be aware that certain pain meds cannot be called into a pharmacy and actually require a paper prescription.  A change may be made in your pain med as you progress thru your recovery period or if you have side effects to certain meds.    --Pain meds are NOT refilled after 5pm on weekdays, and NOT AT ALL on the weekends,  so please look ahead to prevent problems.      Q:  Why am I having a hard time sleeping now that I am at home?  A:  Many medications you receive while you are in the hospital can impact your sleep for a number of days after your surgery/hospitalization.  Decreased level of activity and naps during the day may also make sleeping at night difficult.  Try to minimize day-time naps, and get up frequently during the day to walk around your home during your recovery time.  Sleep aides may be of some help, but are not recommended for long-term use.      Q:  I am having some back discomfort.  What should I do?  A:  This may be related to certain positioning that was required for your surgery, extended periods of time in bed, or other changes in your overall activity level.  You may try ice, heat, acetaminophen, or ibuprofen to treat this temporarily.  Note that many pain medications have acetaminophen in them and would state this on the prescription bottle.  Be sure not to exceed the maximum of 4000mg per day of acetaminophen.     **If the pain you are having does not resolve, is severe, or is a flare of back pain you have had on other occasions prior to surgery, please contact your primary physician for further recommendations or for an appointment to be examined at their office.    Q:  Why am I having headaches?  A:  Headaches can be caused by many things:  caffeine withdrawal, use of pain meds, dehydration, high blood pressure, lack of sleep, over-activity/exhaustion, flare-up of usual migraine headaches.  If you feel this is related to muscle tension (a band-like feeling around the head, or a pressure at the low-back of the head) you may try ice or heat to this area.  You may need to drink more fluids (try electrolyte drink like Gatorade), rest, or take your usual migraine medications.   **If your headaches do not resolve, worsen, are accompanied by other symptoms, or if your blood pressure is high, please call your  primary physician for recommendation and/or examination.    Q:  I am unable to urinate.  What do I do?  A:  A small percentage of people can have difficulty urinating initially after surgery.  This includes being able to urinate only a very small amount at a time and feeling discomfort or pressure in the very low abdomen.  This is called  urinary retention , and is actually an urgent situation.  Proceed to your nearest Emergency department for evaluation (not an Urgent Care Center).  Sometimes the bladder does not work correctly after certain medications you receive during surgery, or related to certain procedures.  You may need to have a catheter placed until your bladder recovers.  When planning to go to an Emergency department, it may help to call the ER to let them know you are coming in for this problem after a surgery.  This may help you get in quicker to be evaluated.  **If you have symptoms of a urinary tract infection, please contact your primary physician for the proper evaluation and treatment.          If you have other questions, please call the office Monday thru Friday between 8am and 5pm to discuss with the nurse or physician assistant.  #[phone]    There is a surgeon ON CALL on weekday evenings and over the weekend in case of urgent need only, and may be contacted at the same number.    If you are having an emergency, call 911 or proceed to your nearest emergency department.

## 2021-04-07 NOTE — BRIEF OP NOTE
[facility]    Brief Operative Note    Pre-operative diagnosis: Malignant neoplasm of right breast (H) [C50.911]  Post-operative diagnosis Right breast cancer    Procedure: Procedure(s):  RIGHT BREAST SEED LOCALIZED LUMPECTOMY, RIGHT AXILLARY SENTINEL NODE BIOPSY  Surgeon: Surgeon(s) and Role:     * Kale Quarles MD - Primary     * Abimbola Heard PA-C - Assisting  Anesthesia: General   Estimated blood loss: Less than 10 ml  Drains: None  Specimens:   ID Type Source Tests Collected by Time Destination   A : Right breast lumpectomy, gross for margins Tissue Breast, Right SURGICAL PATHOLOGY EXAM Kale Quarles MD 4/7/2021 10:12 AM    B : Right axillary sentinel node #1 Tissue Lymph Node, [place] SURGICAL PATHOLOGY EXAM Kale Quarles MD 4/7/2021 10:47 AM    C : Right axilla Tissue Axilla, Right SURGICAL PATHOLOGY EXAM Kale Quarles MD 4/7/2021 10:47 AM    D : Right axillary sentinel node #2 Tissue Lymph Node, [place] SURGICAL PATHOLOGY EXAM Kale Quarles MD 4/7/2021 10:48 AM    E : Right axillary sentinel node #3 Tissue Lymph Node, [place] SURGICAL PATHOLOGY EXAM Kale Quarles MD 4/7/2021 10:48 AM    F : Right axillary sentinel node #4 Tissue Lymph Node, [place] SURGICAL PATHOLOGY EXAM Kale Quarles MD 4/7/2021 10:48 AM    G : Re-excision superior right breast, Stitch marks new margin Tissue Breast, Right SURGICAL PATHOLOGY EXAM Kale Quarles MD 4/7/2021 11:02 AM    H : Re-excision lateral right breast, Stitch marks new margin Tissue Breast, Right SURGICAL PATHOLOGY EXAM Kale Quarles MD 4/7/2021 11:06 AM      Findings:   Specimen radiograph showed clip and seed. Breast tissue was relatively dense and fibrous. Biopsy site close to superior and lateral edges which were therefore re-excised. Four grossly normal appearing lymph nodes removed as sentinel..  Complications: None.  Implants: * No  implants in log *

## 2021-04-09 ENCOUNTER — PREP FOR PROCEDURE (OUTPATIENT)
Dept: SURGERY | Facility: CLINIC | Age: 46
End: 2021-04-09

## 2021-04-09 ENCOUNTER — TELEPHONE (OUTPATIENT)
Dept: SURGERY | Facility: CLINIC | Age: 46
End: 2021-04-09

## 2021-04-09 DIAGNOSIS — C50.911 MALIGNANT NEOPLASM OF RIGHT BREAST (H): Primary | ICD-10-CM

## 2021-04-09 LAB — COPATH REPORT: NORMAL

## 2021-04-09 NOTE — TELEPHONE ENCOUNTER
Type of surgery: REEXCISION  RIGHT  BREAST LUMPECTOMY MARGIN   Location of surgery: [facility]  Date and time of surgery: 4/28/2021 @ 11:30 AM   Surgeon: ANNE HUGHES MD    Pre-Op Appt Date: PATIENT TO SCHEDULE    Post-Op Appt Date: PATIENT TO SCHEDULE     Packet sent out: Yes  Pre-cert/Authorization completed:  Not Applicable  Date: 4/9/2021       REEXCISION  RIGHT  BREAST LUMPECTOMY MARGIN     GENERAL PT INST TO HAVE H&P WITH DR VALENZUELA 30 MIN REQ PA ASSIST RMO NMS

## 2021-04-09 NOTE — RESULT ENCOUNTER NOTE
Patient was phoned by me with result.  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 Will assist in scheduling this with her.

## 2021-04-12 ENCOUNTER — TRANSFERRED RECORDS (OUTPATIENT)
Dept: SURGERY | Facility: CLINIC | Age: 46
End: 2021-04-12

## 2021-04-13 DIAGNOSIS — Z11.59 ENCOUNTER FOR SCREENING FOR OTHER VIRAL DISEASES: ICD-10-CM

## 2021-04-19 ENCOUNTER — OFFICE VISIT (OUTPATIENT)
Dept: FAMILY MEDICINE | Facility: CLINIC | Age: 46
End: 2021-04-19
Payer: COMMERCIAL

## 2021-04-19 VITALS
OXYGEN SATURATION: 99 % | RESPIRATION RATE: 12 BRPM | WEIGHT: 167 LBS | SYSTOLIC BLOOD PRESSURE: 94 MMHG | TEMPERATURE: 97.5 F | BODY MASS INDEX: 26.16 KG/M2 | DIASTOLIC BLOOD PRESSURE: 60 MMHG | HEART RATE: 66 BPM

## 2021-04-19 DIAGNOSIS — C73 PAPILLARY THYROID CARCINOMA (H): ICD-10-CM

## 2021-04-19 DIAGNOSIS — Z01.818 PREOP GENERAL PHYSICAL EXAM: Primary | ICD-10-CM

## 2021-04-19 DIAGNOSIS — C50.911 INVASIVE DUCTAL CARCINOMA OF BREAST, RIGHT (H): ICD-10-CM

## 2021-04-19 PROCEDURE — 99214 OFFICE O/P EST MOD 30 MIN: CPT | Performed by: INTERNAL MEDICINE

## 2021-04-19 NOTE — PROGRESS NOTES
[facility]  [address] [facility] [address]  Phone: [phone]  Primary Provider: Darling Valenzuela  Pre-op Performing Provider: DARLING VALENZUELA      PREOPERATIVE EVALUATION:  Today's date: 4/19/2021    Niki Gurrola is a 45 year old female who presents for a preoperative evaluation.    Surgical Information:  Surgery/Procedure: REEXCISION RIGHT  BREAST LUMPECTOMY MARGIN  Surgery Location: [facility] OR  Surgeon: Kale Quarles MD  Surgery Date: 4/28/21  Time of Surgery: 11:15pm  Where patient plans to recover: At home with family  Fax number for surgical facility: Note does not need to be faxed, will be available electronically in Epic.    Type of Anesthesia Anticipated: General    Assessment & Plan     The proposed surgical procedure is considered LOW risk.    (Z01.818) Preop general physical exam  (primary encounter diagnosis)  Comment: Right invasive ductal carcinoma; wider excision has been recommended.   Plan:     (C50.911) Invasive ductal carcinoma of breast, right (H)  Comment: pT1c, N0 (SN), M N/A. ; ER +, MS+, HER2 - 4/7/2021 lumpectomy ; desire wider excision to lessen risk for recurrence.  Awaiting additional testing per Dr. Russo  Plan: pt waiting on further test results from Dr. Russo; if results elevated, then anticipate chemotherapy; if that is the plan, the surgeon will also place port at time of breast wider excision procedure on 4/28/2021.  May need to delay surgery if those results are not known. Pt informed that this preop remains valid through May 19/2021 if needed.      (C73) Papillary thyroid carcinoma (H)  Comment: bx of left lobe Positive for papillary carcinoma; left thyroid lobectomy is recommended in the next 3 months Bx done at [facility]; She plans to have surgery @[place]  Plan: pt plans to follow up with Endocrine and surgery at [facility] once healed from breast surgery.   Pt advised that Dr May Borrero and Dr. Krista Cardenas are excellent  thyroid surgeons ( same group as Dr. Cooney) if needed.          Risks and Recommendations:  The patient has the following additional risks and recommendations for perioperative complications:   - No identified additional risk factors other than previously addressed    --Approval given to proceed with proposed procedure, without further diagnostic evaluation  --Pt advised to avoid NSAIDS (Motrin, Ibuprofen, Aleve or Naprosyn);  If needed, Tylenol or Acetaminophen are fine to use.  --meds reviewed; she is on no chronic, daily medication and may hold meds on AM of surgery.   --Pain medications, time off from work and FMLA following surgery deferred to surgeon.      RECOMMENDATION:  APPROVAL GIVEN to proceed with proposed procedure, without further diagnostic evaluation.    Review of external notes as documented above   Review of the result(s) of each unique test - reviewed [facility] notes and [facility] notes.   Independent interpretation of a test performed by another physician/other qualified health care professional (not separately reported) - [facility] Thyroid nodule BX path report.       35 minutes spent on the date of the encounter doing chart review, review of outside records, review of test results, interpretation of tests, patient visit and documentation     Ayesha Gomez MD  Internal Medicine  electronically signed                     Subjective     HPI related to upcoming procedure: Niki Gurrola is a 45 year old female who presents for preoperative evaluation for anticipated wider excision following Right lumpectomy and lymph node evaluation ; 5 lymph nodes were negative.     Preop Questions 4/18/2021   1. Have you ever had a heart attack or stroke? No   2. Have you ever had surgery on your heart or blood vessels, such as a stent placement, a coronary artery bypass, or surgery on an artery in your head, neck, heart, or legs? No   3. Do you have chest pain with activity? No   4. Do you have a history  of  heart failure? No   5. Do you currently have a cold, bronchitis or symptoms of other infection? No   6. Do you have a cough, shortness of breath, or wheezing? No   7. Do you or anyone in your family have previous history of blood clots? No   8. Do you or does anyone in your family have a serious bleeding problem such as prolonged bleeding following surgeries or cuts? No   9. Have you ever had problems with anemia or been told to take iron pills? No   10. Have you had any abnormal blood loss such as black, tarry or bloody stools, or abnormal vaginal bleeding? No   11. Have you ever had a blood transfusion? No   12. Are you willing to have a blood transfusion if it is medically needed before, during, or after your surgery? Yes   13. Have you or any of your relatives ever had problems with anesthesia? No   14. Do you have sleep apnea, excessive snoring or daytime drowsiness? No   15. Do you have any artifical heart valves or other implanted medical devices like a pacemaker, defibrillator, or continuous glucose monitor? No   16. Do you have artificial joints? No   17. Are you allergic to latex? No   18. Is there any chance that you may be pregnant? No       Health Care Directive:  Patient does not have a Health Care Directive or Living Will: informal conversation with family; she has not yet competed a formal document    Preoperative Review of :   reviewed - received #10 Hydrocodone post operatively;   reviewed.       Status of Chronic Conditions:  New diagnosis of Papillary thyroid carcinoma     Review of Systems  CONSTITUTIONAL: NEGATIVE for fever, chills, change in weight  INTEGUMENTARY/SKIN: right breast surgery and post operative bruising improving,   EYES: NEGATIVE for vision changes or irritation  ENT/MOUTH: NEGATIVE for ear, mouth and throat problems  RESP: NEGATIVE for significant cough or SOB  BREAST: right breast surgery for lumpectomy done 4/7/2021 improving.   CV: NEGATIVE for chest pain,  palpitations or peripheral edema  GI: NEGATIVE for nausea, abdominal pain, heartburn, or change in bowel habits  : NEGATIVE for frequency, dysuria, or hematuria  MUSCULOSKELETAL: NEGATIVE for significant arthralgias or myalgia  NEURO: NEGATIVE for weakness, dizziness or paresthesias  ENDOCRINE: thyroid nodule with recent bx done 2021 at [facility]- found to have Papillary carcinoma of the thyroid   HEME: NEGATIVE for bleeding problems  PSYCHIATRIC: NEGATIVE for changes in mood or affect    Patient Active Problem List    Diagnosis Date Noted     Malignant neoplasm of right breast (H) 2021     Priority: Medium     Added automatically from request for surgery [id number]        Past Medical History:   Diagnosis Date     Cancer (H)      Thyroid disease      Past Surgical History:   Procedure Laterality Date     C  DELIVERY ONLY      , Low Cervical     C/SECTION, LOW TRANSVERSE      , Low Transverse     LUMPECTOMY BREAST, SEED LOCALIZATION, SENTINEL NODE Right 2021    Procedure: RIGHT BREAST SEED LOCALIZED LUMPECTOMY, RIGHT AXILLARY SENTINEL NODE BIOPSY;  Surgeon: Kale Quarles MD;  Location: [facility] OR     ORTHOPEDIC SURGERY       SURGICAL HISTORY OF -       ACL Repair     SURGICAL HISTORY OF -       oral surgery, benign lesion     Current Outpatient Medications   Medication Sig Dispense Refill     HYDROcodone-acetaminophen (NORCO) 5-325 MG tablet Take 1-2 tablets by mouth every 4 hours as needed for moderate to severe pain 10 tablet 0     levonorgestrel (MIRENA, 52 MG,) 20 MCG/24HR IUD          Allergies   Allergen Reactions     No Known Drug Allergies         Social History     Tobacco Use     Smoking status: Never Smoker     Smokeless tobacco: Never Used   Substance Use Topics     Alcohol use: Yes     Comment: 2 drinks per week     Family History   Problem Relation Age of Onset     Lipids Maternal Grandmother      Hypertension Maternal Grandmother      Lipids Paternal  Grandfather      Lipids Father      Cancer Paternal Grandmother         liver and colon     Lipids Paternal Grandmother         ??     Diabetes Paternal Grandmother         adult onset     History   Drug Use No         Objective     BP 94/60 (BP Location: Right arm, Patient Position: Sitting, Cuff Size: Adult Regular)   Pulse 66   Temp 97.5  F (36.4  C) (Oral)   Resp 12   Wt 75.8 kg (167 lb)   SpO2 99%   BMI 26.16 kg/m      Physical Exam    GENERAL APPEARANCE: healthy, alert and no distress     EYES: EOMI, PERRL     HENT: ear canals and TM's normal and nose and mouth without ulcers or lesions     NECK: thyroid nodule nontender;  No bruit      RESP: lungs clear to auscultation - no rales, rhonchi or wheezes     CV: regular rates and rhythm, normal S1 S2, no S3 or S4 and no murmur, click or rub     ABDOMEN:  soft, nontender, no HSM or masses and bowel sounds normal     MS: extremities normal- no gross deformities noted, no evidence of inflammation in joints, FROM in all extremities.     SKIN: no suspicious lesions or rashes     NEURO: Normal strength and tone, sensory exam grossly normal, mentation intact and speech normal     PSYCH: mentation appears normal. and affect normal/bright    Recent Labs   Lab Test 02/23/21  1320   HGB 12.6         POTASSIUM 3.2*   CR 0.67        Diagnostics:  Recent Results (from the past 720 hour(s))   Asymptomatic COVID-19 Virus (Coronavirus) by PCR    Collection Time: 04/05/21 12:00 PM    Specimen: Nasopharyngeal   Result Value Ref Range    COVID-19 Virus PCR to [facility] - Source Nasopharyngeal     COVID-19 Virus PCR to [facility] - Result       Test received-See reflex to IDDL test SARS CoV2 (COVID-19) Virus RT-PCR   SARS-CoV-2 COVID-19 Virus (Coronavirus) by PCR    Collection Time: 04/05/21 12:00 PM    Specimen: Nasopharyngeal   Result Value Ref Range    SARS-CoV-2 Virus Specimen Source Nasopharyngeal     SARS-CoV-2 PCR Result NEGATIVE     SARS-CoV-2 PCR Comment        Testing was performed using the soraida SARS-CoV-2 assay on the soraida 6800 System.2   HCG qualitative urine    Collection Time: 04/07/21  7:15 AM   Result Value Ref Range    HCG Qual Urine Negative NEG^Negative   Surgical pathology exam    Collection Time: 04/07/21 10:12 AM   Result Value Ref Range    Copath Report       Patient Name: MIR PACHECO  MR#: [id number]  Specimen #: [id number]  Collected: 4/7/2021  Received: 4/7/2021  Reported: 4/9/2021 15:17  Ordering Phy(s): ANNE CLOUD    For improved result formatting, select 'View Enhanced Report Format' under   Linked Documents section.    SPECIMEN(S):  A: Breast lumpectomy, right  B: [place] lymph node, right axillary #1  C: Lymph nodes, right axilla  D: [place] lymph node, right axillary #2  E: [place] lymph node, right axillary #3  F: [place] lymph node, right axillary #4  G: Re-excision superior right breast  H: Re-excision lateral right breast    FINAL DIAGNOSIS:  A, G and H.  Breast, right, radioactive seed localized lumpectomy and   reexcision of superior and lateral  margins:    -Tumor Site: 9:00 (per prior biopsy [id number]).    -Tumor Type: Invasive ductal carcinoma.    -Columbus Grade: Histologic grade 1.    - Columbus Score (tubule formation + nuclear pleomorphism + mitotic   activity):  2 + 2 + 1= score 5.    -Invasive Tu mor Size: 15 mm.    -In-Situ Tumor: Ductal carcinoma in situ, micropapillary and cribriform,   low and intermediate nuclear grades.                : Lobular carcinoma in situ, classic and pleomorphic, focal.    -Lymph-Vascular Invasion: Not identified.    -Tumor Extent (Skin/Skeletal Muscle Involvement): No skin or skeletal   muscle present for evaluation.    -MARGINS: Negative.  -Invasive Carcinoma: Margins negative for invasive carcinoma.  Invasive   carcinoma at 4 mm from medial margin,  5 mm from posterior margin, 8 mm from inferior margin and 10 mm or greater   from all other margins.    -In-situ Carcinoma: Margins  negative for ductal carcinoma in situ.  Ductal   carcinoma in situ at 1 mm from  posterior margin, 1 mm from medial margin, 2 mm from anterior margin, 7 mm   from inferior margin and 10 mm or  greater from all other margins.    Pleomorphic lobular carcinoma in situ at 10 mm or greater from all   margins.    -LYMPH NODES:  -Total # of Lymph Nodes Examined:  5 (sentinel n odes - specimens B, D, E   and F: 4 and non-sentinel node -  specimen C: 1).  -Number of Lymph Nodes with metastatic tumor:  0  -Number with Macrometastases:  0  -Number with Micrometastases:  0  -Number with Isolated Tumor Cell Clusters:  0  -Size of Largest Metastatic Deposit: Not applicable.  -Extranodal Extension: Not applicable.    -ADDITIONAL PATHOLOGIC FINDINGS: Changes of prior biopsy site,   proliferative fibrocystic changes, multiple  foci of columnar cell change/hyperplasia, multiple foci of columnar cell   change with atypia (flat epithelial  atypia), multiple foci of atypical ductal hyperplasia, radial scars and   intraductal papilloma.    PATHOLOGIC STAGING (pTNM):  pT1c, N0 (SN), M N/A.    -HER-2/Neeru Testing by Fluorescence In-Situ Hybridization (FISH): Negative   (Group 5).  Performed on original needle biopsy specimen ([id number]).  See prior report   for details.    -Estrogen and Progesterone Receptor Study by Immunohistochemistry:  ER: Positive; LA: Positive.  Performe d on original needle biopsy specimen ([id number]).  See prior report   for details.    B.  Lymph node, right axillary/sentinel node #1, biopsy:  - No evidence of malignancy (0/1).    C.  Lymph node, right axillary/non-sentinel node, biopsy:  - No evidence of malignancy (0/1).    D.  Lymph node, right axillary/sentinel node #2, biopsy:  - No evidence of malignancy (0/1).    E.  Lymph node, right axillary/sentinel node #3, biopsy:  - No evidence of malignancy (0/1).    F.  Lymph node, right axillary/sentinel node #4,, biopsy:  - No evidence of malignancy  "(0/1).    Electronically signed out by:    Luis Cervantes M.D.    GROSS:  A. The specimen is received fresh for intraoperative consultation with the   proper patient identification,  labeled \"right breast lumpectomy\". The specimen consists of a 5.7 cm   (anterior to posterior) by 4.5 cm  (lateral to medial) by 3.1 cm (superior to inferior) fibrofatty lumpectomy   specimen. A short stitch designates  superior and a long stitch designa tanisha lateral. The specimen is inked as   follows:    Green - Anterior  Black - Posterior  Red - Superior  Blue - Inferior  Orange - Lateral  Yellow - Medial    The specimen is serially sectioned from anterior to posterior into 11   slices (averaging 0.5 cm in thickness  each). Located within slice 4 is a 0.5 cm previous biopsy site. Within the   previous I see site is a silver  cylindrical radioactive seed. The radioactive seed is returned to the   breast center. Surrounding the previous  biopsy site is an ill-defined 1.5 x 1.5 x 1.4 cm rubbery to firm probable   mass. Surrounding the mass is  extensively indurated fibrous tissue, therefore definitive measurements of   the mass is indeterminant. The  previous biopsy site is located 0.2 cm from the superior margin, 2.1 cm   from the inferior margin, 1.0 cm from  the medial margin, 1.1 cm from the lateral margin, 1.1 cm from the   anterior margin and over 2 cm from the  posterior margin. The prominence of the indurated fibrous tissue and    probable mass is located approximately  0.2 cm from the anterior-lateral margin. The remaining specimen is   comprised of approximately 60% indurated  fibrous tissue and approximately 40% yellow lobulated adipose tissue.   Gross intraoperative margins are  performed and reported back to the submitting surgeon. The specimen is   entirely submitted as follows:    A1-A2-slice 1, anterior margin, perpendicular  A3-A4-slice 2, bisected  A5-A6-slice 3, bisected  A7-A9-slice 4, trisected  P22-G84-jkdyi " "5, quadrisected  D77-L96-ahaem 6, trisected  I15-D82-eumvx 7, trisected (prominence of potential mass)  S27-Y78-wigwd 8, trisected  C77-R38-pfhyo 9, trisected  X60-T18-eyvfl 10, bisected  J52-X35-knlbf 11, posterior margin, perpendicular    Time specimen collected: 1012  Time in formalin: 1105    B. The specimen is received in formalin with the proper patient   identification, labeled \"right axillary  sentinel node #1\". The specimen consists of a focally blue stained 0.8 cm   candidate lymph  node. The lymph node  is bisected and entirely submitted in one cassette.    Time specimen collected: 1047  Time in formalin: 1055    C. The specimen is received in formalin with the proper patient   identification, labeled \"right axilla\". The  specimen consists of 2.1 cm candidate lymph node. The lymph node is   serially sectioned and entirely submitted  in 3 cassettes.    Time specimen collected: 1047  Time in formalin: 1055    D. The specimen is received in formalin with the proper patient   identification, labeled \"right axillary  sentinel node #2\". The specimen consists of a 0.9 cm blue stain candidate   lymph node. The lymph node is  trisected and entirely submitted in one cassette.    Time specimen collected: 1048  Time in formalin: 1055    E. The specimen is received in formalin with the proper patient   identification, labeled \"right axillary  sentinel node #3\". The specimen consists of a 0.8 cm candidate lymph node.   The lymph node is bisected and  entirely submitted in one  cassette.    Time specimen collected: 1048  Time in formalin: 1055    F. The specimen is received in formalin with the proper patient   identification, labeled \"right axillary  sentinel lymph node #4\". The specimen consists of a 0.4 cm candidate lymph   node. The lymph node is bisected  and entirely submitted in one cassette.    Time specimen collected: 1048  Time in formalin: 1055    G. The specimen is received in formalin with the proper " "patient   information, labeled \"reexcision superior  right breast, stitch marks new margin\". The specimen consists of a 3.1 x   2.1 x 1.0 cm portion of fibrofatty  tissue. A stitch is located on one surface designated as the new superior   margin. The surface containing the  stitch is inked red and the opposite surface is inked black. Sectioning   the specimen reveals dense gray-white  fibrous tissue. No definitive mass is identified. The specimen is entirely   submitted in 4 cassettes.    Time specimen collected: 1102  Time in formalin: 1126    H. T he specimen is received in formalin with the proper patient   information, labeled \"reexcision lateral right  breast, stitch marks new margin\". The specimen consists of a 3.5 x 2.6 x   1.4 cm fibrofatty portion of tissue.  A stitch is located on one surface designated as the new lateral margin.   The surface containing the stitch is  inked orange, and the opposite surface is inked black. Sectioning the   specimen reveals predominantly grossly  unremarkable yellow lobulated adipose tissue. The specimen is entirely   submitted in 5 cassettes.    Time specimen collected: 1106  Time in formalin: 1126 (Dictated by: CHARLES Prince 4/7/2021 01:22 PM)    INTRAOPERATIVE CONSULTATION:  Gross consultation:  A.  Right breast lumpectomy, gross margins: Biopsy site close to superior   and lateral margins (MGP).    MICROSCOPIC:  A, G and H.  Microscopic examination was performed.    B through F.  Serial sections of the specimens show five lymph nodes.    There is no evidence of malignancy.    The  technical component of this testing was completed at the [facility], with the professional component performed   at the [facility], [address], [place]  [address]   ([phone])    CPT Codes:  A: 02751-US5, 18094-THX  B: 42954-HW7  C: 58573-EL6  D: 66381-YO4  E: 92081-UP8  F: " 29940-FF7  84625-LR4  H: 61981-RX1    COLLECTION SITE:  Client: [facility]  Location: [facility] (R)        No EKG required, no history of coronary heart disease, significant arrhythmia, peripheral arterial disease or other structural heart disease.    Revised Cardiac Risk Index (RCRI):  The patient has the following serious cardiovascular risks for perioperative complications:   - No serious cardiac risks = 0 points     RCRI Interpretation: 0 points: Class I (very low risk - 0.4% complication rate)           Signed Electronically by: Ayesha Gomez MD  Copy of this evaluation report is provided to requesting physician.

## 2021-04-19 NOTE — H&P (VIEW-ONLY)
[facility]  [address] [address] [address]  Phone: [phone]  Primary Provider: Darling Valenzuela  Pre-op Performing Provider: DARLING VALENZUELA      PREOPERATIVE EVALUATION:  Today's date: 4/19/2021    Niki Gurrola is a 45 year old female who presents for a preoperative evaluation.    Surgical Information:  Surgery/Procedure: REEXCISION RIGHT  BREAST LUMPECTOMY MARGIN  Surgery Location: [facility] OR  Surgeon: Kale Quarles MD  Surgery Date: 4/28/21  Time of Surgery: 11:15pm  Where patient plans to recover: At home with family  Fax number for surgical facility: Note does not need to be faxed, will be available electronically in Epic.    Type of Anesthesia Anticipated: General    Assessment & Plan     The proposed surgical procedure is considered LOW risk.    (Z01.818) Preop general physical exam  (primary encounter diagnosis)  Comment: Right invasive ductal carcinoma; wider excision has been recommended.   Plan:     (C50.911) Invasive ductal carcinoma of breast, right (H)  Comment: pT1c, N0 (SN), M N/A. ; ER +, UT+, HER2 - 4/7/2021 lumpectomy ; desire wider excision to lessen risk for recurrence.  Awaiting additional testing per Dr. Russo  Plan: pt waiting on further test results from Dr. Russo; if results elevated, then anticipate chemotherapy; if that is the plan, the surgeon will also place port at time of breast wider excision procedure on 4/28/2021.  May need to delay surgery if those results are not known. Pt informed that this preop remains valid through May 19/2021 if needed.      (C73) Papillary thyroid carcinoma (H)  Comment: bx of left lobe Positive for papillary carcinoma; left thyroid lobectomy is recommended in the next 3 months Bx done at [place]; She plans to have surgery @[place]  Plan: pt plans to follow up with Endocrine and surgery at [place] once healed from breast surgery.   Pt advised that Dr Mya Borrero and Dr. Krista Cardenas are excellent  thyroid surgeons ( same group as Dr. Cooney) if needed.          Risks and Recommendations:  The patient has the following additional risks and recommendations for perioperative complications:   - No identified additional risk factors other than previously addressed    --Approval given to proceed with proposed procedure, without further diagnostic evaluation  --Pt advised to avoid NSAIDS (Motrin, Ibuprofen, Aleve or Naprosyn);  If needed, Tylenol or Acetaminophen are fine to use.  --meds reviewed; she is on no chronic, daily medication and may hold meds on AM of surgery.   --Pain medications, time off from work and FMLA following surgery deferred to surgeon.      RECOMMENDATION:  APPROVAL GIVEN to proceed with proposed procedure, without further diagnostic evaluation.    Review of external notes as documented above   Review of the result(s) of each unique test - reviewed [facility] notes and [facility] notes.   Independent interpretation of a test performed by another physician/other qualified health care professional (not separately reported) - [facility] Thyroid nodule BX path report.       35 minutes spent on the date of the encounter doing chart review, review of outside records, review of test results, interpretation of tests, patient visit and documentation     Ayesha Gomez MD  Internal Medicine  electronically signed                     Subjective     HPI related to upcoming procedure: Niki Gurrola is a 45 year old female who presents for preoperative evaluation for anticipated wider excision following Right lumpectomy and lymph node evaluation ; 5 lymph nodes were negative.     Preop Questions 4/18/2021   1. Have you ever had a heart attack or stroke? No   2. Have you ever had surgery on your heart or blood vessels, such as a stent placement, a coronary artery bypass, or surgery on an artery in your head, neck, heart, or legs? No   3. Do you have chest pain with activity? No   4. Do you have a history  of  heart failure? No   5. Do you currently have a cold, bronchitis or symptoms of other infection? No   6. Do you have a cough, shortness of breath, or wheezing? No   7. Do you or anyone in your family have previous history of blood clots? No   8. Do you or does anyone in your family have a serious bleeding problem such as prolonged bleeding following surgeries or cuts? No   9. Have you ever had problems with anemia or been told to take iron pills? No   10. Have you had any abnormal blood loss such as black, tarry or bloody stools, or abnormal vaginal bleeding? No   11. Have you ever had a blood transfusion? No   12. Are you willing to have a blood transfusion if it is medically needed before, during, or after your surgery? Yes   13. Have you or any of your relatives ever had problems with anesthesia? No   14. Do you have sleep apnea, excessive snoring or daytime drowsiness? No   15. Do you have any artifical heart valves or other implanted medical devices like a pacemaker, defibrillator, or continuous glucose monitor? No   16. Do you have artificial joints? No   17. Are you allergic to latex? No   18. Is there any chance that you may be pregnant? No       Health Care Directive:  Patient does not have a Health Care Directive or Living Will: informal conversation with family; she has not yet competed a formal document    Preoperative Review of :   reviewed - received #10 Hydrocodone post operatively;   reviewed.       Status of Chronic Conditions:  New diagnosis of Papillary thyroid carcinoma     Review of Systems  CONSTITUTIONAL: NEGATIVE for fever, chills, change in weight  INTEGUMENTARY/SKIN: right breast surgery and post operative bruising improving,   EYES: NEGATIVE for vision changes or irritation  ENT/MOUTH: NEGATIVE for ear, mouth and throat problems  RESP: NEGATIVE for significant cough or SOB  BREAST: right breast surgery for lumpectomy done 4/7/2021 improving.   CV: NEGATIVE for chest pain,  palpitations or peripheral edema  GI: NEGATIVE for nausea, abdominal pain, heartburn, or change in bowel habits  : NEGATIVE for frequency, dysuria, or hematuria  MUSCULOSKELETAL: NEGATIVE for significant arthralgias or myalgia  NEURO: NEGATIVE for weakness, dizziness or paresthesias  ENDOCRINE: thyroid nodule with recent bx done 2021 at [place]- found to have Papillary carcinoma of the thyroid   HEME: NEGATIVE for bleeding problems  PSYCHIATRIC: NEGATIVE for changes in mood or affect    Patient Active Problem List    Diagnosis Date Noted     Malignant neoplasm of right breast (H) 2021     Priority: Medium     Added automatically from request for surgery [id number]        Past Medical History:   Diagnosis Date     Cancer (H)      Thyroid disease      Past Surgical History:   Procedure Laterality Date     C  DELIVERY ONLY      , Low Cervical     C/SECTION, LOW TRANSVERSE      , Low Transverse     LUMPECTOMY BREAST, SEED LOCALIZATION, SENTINEL NODE Right 2021    Procedure: RIGHT BREAST SEED LOCALIZED LUMPECTOMY, RIGHT AXILLARY SENTINEL NODE BIOPSY;  Surgeon: Kale Quarles MD;  Location: [facility] OR     ORTHOPEDIC SURGERY       SURGICAL HISTORY OF -       ACL Repair     SURGICAL HISTORY OF -       oral surgery, benign lesion     Current Outpatient Medications   Medication Sig Dispense Refill     HYDROcodone-acetaminophen (NORCO) 5-325 MG tablet Take 1-2 tablets by mouth every 4 hours as needed for moderate to severe pain 10 tablet 0     levonorgestrel (MIRENA, 52 MG,) 20 MCG/24HR IUD          Allergies   Allergen Reactions     No Known Drug Allergies         Social History     Tobacco Use     Smoking status: Never Smoker     Smokeless tobacco: Never Used   Substance Use Topics     Alcohol use: Yes     Comment: 2 drinks per week     Family History   Problem Relation Age of Onset     Lipids Maternal Grandmother      Hypertension Maternal Grandmother      Lipids Paternal  Grandfather      Lipids Father      Cancer Paternal Grandmother         liver and colon     Lipids Paternal Grandmother         ??     Diabetes Paternal Grandmother         adult onset     History   Drug Use No         Objective     BP 94/60 (BP Location: Right arm, Patient Position: Sitting, Cuff Size: Adult Regular)   Pulse 66   Temp 97.5  F (36.4  C) (Oral)   Resp 12   Wt 75.8 kg (167 lb)   SpO2 99%   BMI 26.16 kg/m      Physical Exam    GENERAL APPEARANCE: healthy, alert and no distress     EYES: EOMI, PERRL     HENT: ear canals and TM's normal and nose and mouth without ulcers or lesions     NECK: thyroid nodule nontender;  No bruit      RESP: lungs clear to auscultation - no rales, rhonchi or wheezes     CV: regular rates and rhythm, normal S1 S2, no S3 or S4 and no murmur, click or rub     ABDOMEN:  soft, nontender, no HSM or masses and bowel sounds normal     MS: extremities normal- no gross deformities noted, no evidence of inflammation in joints, FROM in all extremities.     SKIN: no suspicious lesions or rashes     NEURO: Normal strength and tone, sensory exam grossly normal, mentation intact and speech normal     PSYCH: mentation appears normal. and affect normal/bright    Recent Labs   Lab Test 02/23/21  1320   HGB 12.6         POTASSIUM 3.2*   CR 0.67        Diagnostics:  Recent Results (from the past 720 hour(s))   Asymptomatic COVID-19 Virus (Coronavirus) by PCR    Collection Time: 04/05/21 12:00 PM    Specimen: Nasopharyngeal   Result Value Ref Range    COVID-19 Virus PCR to [facility] - Source Nasopharyngeal     COVID-19 Virus PCR to [facility] - Result       Test received-See reflex to IDDL test SARS CoV2 (COVID-19) Virus RT-PCR   SARS-CoV-2 COVID-19 Virus (Coronavirus) by PCR    Collection Time: 04/05/21 12:00 PM    Specimen: Nasopharyngeal   Result Value Ref Range    SARS-CoV-2 Virus Specimen Source Nasopharyngeal     SARS-CoV-2 PCR Result NEGATIVE     SARS-CoV-2 PCR Comment        Testing was performed using the soraida SARS-CoV-2 assay on the soraida 6800 System.2   HCG qualitative urine    Collection Time: 04/07/21  7:15 AM   Result Value Ref Range    HCG Qual Urine Negative NEG^Negative   Surgical pathology exam    Collection Time: 04/07/21 10:12 AM   Result Value Ref Range    Copath Report       Patient Name: MIR PACHECO  MR#: [id number]  Specimen #: [id number]  Collected: 4/7/2021  Received: 4/7/2021  Reported: 4/9/2021 15:17  Ordering Phy(s): ANNE CLOUD    For improved result formatting, select 'View Enhanced Report Format' under   Linked Documents section.    SPECIMEN(S):  A: Breast lumpectomy, right  B: Watson lymph node, right axillary #1  C: Lymph nodes, right axilla  D: Watson lymph node, right axillary #2  E: Watson lymph node, right axillary #3  F: Watson lymph node, right axillary #4  G: Re-excision superior right breast  H: Re-excision lateral right breast    FINAL DIAGNOSIS:  A, G and H.  Breast, right, radioactive seed localized lumpectomy and   reexcision of superior and lateral  margins:    -Tumor Site: 9:00 (per prior biopsy [id number]).    -Tumor Type: Invasive ductal carcinoma.    -Melcroft Grade: Histologic grade 1.    - Melcroft Score (tubule formation + nuclear pleomorphism + mitotic   activity):  2 + 2 + 1= score 5.    -Invasive Tu mor Size: 15 mm.    -In-Situ Tumor: Ductal carcinoma in situ, micropapillary and cribriform,   low and intermediate nuclear grades.                : Lobular carcinoma in situ, classic and pleomorphic, focal.    -Lymph-Vascular Invasion: Not identified.    -Tumor Extent (Skin/Skeletal Muscle Involvement): No skin or skeletal   muscle present for evaluation.    -MARGINS: Negative.  -Invasive Carcinoma: Margins negative for invasive carcinoma.  Invasive   carcinoma at 4 mm from medial margin,  5 mm from posterior margin, 8 mm from inferior margin and 10 mm or greater   from all other margins.    -In-situ Carcinoma: Margins  negative for ductal carcinoma in situ.  Ductal   carcinoma in situ at 1 mm from  posterior margin, 1 mm from medial margin, 2 mm from anterior margin, 7 mm   from inferior margin and 10 mm or  greater from all other margins.    Pleomorphic lobular carcinoma in situ at 10 mm or greater from all   margins.    -LYMPH NODES:  -Total # of Lymph Nodes Examined:  5 (sentinel n odes - specimens B, D, E   and F: 4 and non-sentinel node -  specimen C: 1).  -Number of Lymph Nodes with metastatic tumor:  0  -Number with Macrometastases:  0  -Number with Micrometastases:  0  -Number with Isolated Tumor Cell Clusters:  0  -Size of Largest Metastatic Deposit: Not applicable.  -Extranodal Extension: Not applicable.    -ADDITIONAL PATHOLOGIC FINDINGS: Changes of prior biopsy site,   proliferative fibrocystic changes, multiple  foci of columnar cell change/hyperplasia, multiple foci of columnar cell   change with atypia (flat epithelial  atypia), multiple foci of atypical ductal hyperplasia, radial scars and   intraductal papilloma.    PATHOLOGIC STAGING (pTNM):  pT1c, N0 (SN), M N/A.    -HER-2/Neeru Testing by Fluorescence In-Situ Hybridization (FISH): Negative   (Group 5).  Performed on original needle biopsy specimen ([id number]).  See prior report   for details.    -Estrogen and Progesterone Receptor Study by Immunohistochemistry:  ER: Positive; AK: Positive.  Performe d on original needle biopsy specimen ([id number]).  See prior report   for details.    B.  Lymph node, right axillary/sentinel node #1, biopsy:  - No evidence of malignancy (0/1).    C.  Lymph node, right axillary/non-sentinel node, biopsy:  - No evidence of malignancy (0/1).    D.  Lymph node, right axillary/sentinel node #2, biopsy:  - No evidence of malignancy (0/1).    E.  Lymph node, right axillary/sentinel node #3, biopsy:  - No evidence of malignancy (0/1).    F.  Lymph node, right axillary/sentinel node #4,, biopsy:  - No evidence of malignancy  "(0/1).    Electronically signed out by:    Luis Cervantes M.D.    GROSS:  A. The specimen is received fresh for intraoperative consultation with the   proper patient identification,  labeled \"right breast lumpectomy\". The specimen consists of a 5.7 cm   (anterior to posterior) by 4.5 cm  (lateral to medial) by 3.1 cm (superior to inferior) fibrofatty lumpectomy   specimen. A short stitch designates  superior and a long stitch designa tanisha lateral. The specimen is inked as   follows:    Green - Anterior  Black - Posterior  Red - Superior  Blue - Inferior  Orange - Lateral  Yellow - Medial    The specimen is serially sectioned from anterior to posterior into 11   slices (averaging 0.5 cm in thickness  each). Located within slice 4 is a 0.5 cm previous biopsy site. Within the   previous I see site is a silver  cylindrical radioactive seed. The radioactive seed is returned to the   breast center. Surrounding the previous  biopsy site is an ill-defined 1.5 x 1.5 x 1.4 cm rubbery to firm probable   mass. Surrounding the mass is  extensively indurated fibrous tissue, therefore definitive measurements of   the mass is indeterminant. The  previous biopsy site is located 0.2 cm from the superior margin, 2.1 cm   from the inferior margin, 1.0 cm from  the medial margin, 1.1 cm from the lateral margin, 1.1 cm from the   anterior margin and over 2 cm from the  posterior margin. The prominence of the indurated fibrous tissue and    probable mass is located approximately  0.2 cm from the anterior-lateral margin. The remaining specimen is   comprised of approximately 60% indurated  fibrous tissue and approximately 40% yellow lobulated adipose tissue.   Gross intraoperative margins are  performed and reported back to the submitting surgeon. The specimen is   entirely submitted as follows:    A1-A2-slice 1, anterior margin, perpendicular  A3-A4-slice 2, bisected  A5-A6-slice 3, bisected  A7-A9-slice 4, trisected  C37-S51-ybguh " "5, quadrisected  I89-G02-vjllj 6, trisected  M32-K77-djyvv 7, trisected (prominence of potential mass)  V83-H45-kfnca 8, trisected  H59-R47-azpev 9, trisected  Y01-F31-fualv 10, bisected  H66-B32-gbjso 11, posterior margin, perpendicular    Time specimen collected: 1012  Time in formalin: 1105    B. The specimen is received in formalin with the proper patient   identification, labeled \"right axillary  sentinel node #1\". The specimen consists of a focally blue stained 0.8 cm   candidate lymph  node. The lymph node  is bisected and entirely submitted in one cassette.    Time specimen collected: 1047  Time in formalin: 1055    C. The specimen is received in formalin with the proper patient   identification, labeled \"right axilla\". The  specimen consists of 2.1 cm candidate lymph node. The lymph node is   serially sectioned and entirely submitted  in 3 cassettes.    Time specimen collected: 1047  Time in formalin: 1055    D. The specimen is received in formalin with the proper patient   identification, labeled \"right axillary  sentinel node #2\". The specimen consists of a 0.9 cm blue stain candidate   lymph node. The lymph node is  trisected and entirely submitted in one cassette.    Time specimen collected: 1048  Time in formalin: 1055    E. The specimen is received in formalin with the proper patient   identification, labeled \"right axillary  sentinel node #3\". The specimen consists of a 0.8 cm candidate lymph node.   The lymph node is bisected and  entirely submitted in one  cassette.    Time specimen collected: 1048  Time in formalin: 1055    F. The specimen is received in formalin with the proper patient   identification, labeled \"right axillary  sentinel lymph node #4\". The specimen consists of a 0.4 cm candidate lymph   node. The lymph node is bisected  and entirely submitted in one cassette.    Time specimen collected: 1048  Time in formalin: 1055    G. The specimen is received in formalin with the proper " "patient   information, labeled \"reexcision superior  right breast, stitch marks new margin\". The specimen consists of a 3.1 x   2.1 x 1.0 cm portion of fibrofatty  tissue. A stitch is located on one surface designated as the new superior   margin. The surface containing the  stitch is inked red and the opposite surface is inked black. Sectioning   the specimen reveals dense gray-white  fibrous tissue. No definitive mass is identified. The specimen is entirely   submitted in 4 cassettes.    Time specimen collected: 1102  Time in formalin: 1126    H. T he specimen is received in formalin with the proper patient   information, labeled \"reexcision lateral right  breast, stitch marks new margin\". The specimen consists of a 3.5 x 2.6 x   1.4 cm fibrofatty portion of tissue.  A stitch is located on one surface designated as the new lateral margin.   The surface containing the stitch is  inked orange, and the opposite surface is inked black. Sectioning the   specimen reveals predominantly grossly  unremarkable yellow lobulated adipose tissue. The specimen is entirely   submitted in 5 cassettes.    Time specimen collected: 1106  Time in formalin: 1126 (Dictated by: CHARLES Prince 4/7/2021 01:22 PM)    INTRAOPERATIVE CONSULTATION:  Gross consultation:  A.  Right breast lumpectomy, gross margins: Biopsy site close to superior   and lateral margins (MGP).    MICROSCOPIC:  A, G and H.  Microscopic examination was performed.    B through F.  Serial sections of the specimens show five lymph nodes.    There is no evidence of malignancy.    The  technical component of this testing was completed at the [facility], with the professional component performed   at the [facility], [address], [place]  [address]   ([phone])    CPT Codes:  A: 14675-LG9, 79750-KAI  B: 85235-FW6  C: 65169-LS8  D: 90266-UA2  E: 99014-FZ4  F: " 33855-AL1  22111-HL6  H: 17668-II6    COLLECTION SITE:  Client: [facility]  Location: [facility] (R)        No EKG required, no history of coronary heart disease, significant arrhythmia, peripheral arterial disease or other structural heart disease.    Revised Cardiac Risk Index (RCRI):  The patient has the following serious cardiovascular risks for perioperative complications:   - No serious cardiac risks = 0 points     RCRI Interpretation: 0 points: Class I (very low risk - 0.4% complication rate)           Signed Electronically by: Ayesha Gomez MD  Copy of this evaluation report is provided to requesting physician.

## 2021-04-19 NOTE — PATIENT INSTRUCTIONS

## 2021-04-19 NOTE — H&P (VIEW-ONLY)
[facility]  [address] [address]  Phone: [phone]  Primary Provider: Darling Valenzuela  Pre-op Performing Provider: DARLING VALENZUELA      PREOPERATIVE EVALUATION:  Today's date: 4/19/2021    Niki Gurrola is a 45 year old female who presents for a preoperative evaluation.    Surgical Information:  Surgery/Procedure: REEXCISION RIGHT  BREAST LUMPECTOMY MARGIN  Surgery Location: [facility] OR  Surgeon: Kale Quarles MD  Surgery Date: 4/28/21  Time of Surgery: 11:15pm  Where patient plans to recover: At home with family  Fax number for surgical facility: Note does not need to be faxed, will be available electronically in Epic.    Type of Anesthesia Anticipated: General    Assessment & Plan     The proposed surgical procedure is considered LOW risk.    (Z01.818) Preop general physical exam  (primary encounter diagnosis)  Comment: Right invasive ductal carcinoma; wider excision has been recommended.   Plan:     (C50.911) Invasive ductal carcinoma of breast, right (H)  Comment: pT1c, N0 (SN), M N/A. ; ER +, MI+, HER2 - 4/7/2021 lumpectomy ; desire wider excision to lessen risk for recurrence.  Awaiting additional testing per Dr. Russo  Plan: pt waiting on further test results from Dr. Russo; if results elevated, then anticipate chemotherapy; if that is the plan, the surgeon will also place port at time of breast wider excision procedure on 4/28/2021.  May need to delay surgery if those results are not known. Pt informed that this preop remains valid through May 19/2021 if needed.      (C73) Papillary thyroid carcinoma (H)  Comment: bx of left lobe Positive for papillary carcinoma; left thyroid lobectomy is recommended in the next 3 months Bx done at [place]; She plans to have surgery @[place]  Plan: pt plans to follow up with Endocrine and surgery at [place] once healed from breast surgery.   Pt advised that Dr Mya Borrero and Dr. Krista Cardenas are excellent  thyroid surgeons ( same group as Dr. Cooney) if needed.          Risks and Recommendations:  The patient has the following additional risks and recommendations for perioperative complications:   - No identified additional risk factors other than previously addressed    --Approval given to proceed with proposed procedure, without further diagnostic evaluation  --Pt advised to avoid NSAIDS (Motrin, Ibuprofen, Aleve or Naprosyn);  If needed, Tylenol or Acetaminophen are fine to use.  --meds reviewed; she is on no chronic, daily medication and may hold meds on AM of surgery.   --Pain medications, time off from work and FMLA following surgery deferred to surgeon.      RECOMMENDATION:  APPROVAL GIVEN to proceed with proposed procedure, without further diagnostic evaluation.    Review of external notes as documented above   Review of the result(s) of each unique test - reviewed [facility] notes and [facility] notes.   Independent interpretation of a test performed by another physician/other qualified health care professional (not separately reported) - [facility] Thyroid nodule BX path report.       35 minutes spent on the date of the encounter doing chart review, review of outside records, review of test results, interpretation of tests, patient visit and documentation     Ayesha Gomez MD  Internal Medicine  electronically signed                     Subjective     HPI related to upcoming procedure: Niki Gurrola is a 45 year old female who presents for preoperative evaluation for anticipated wider excision following Right lumpectomy and lymph node evaluation ; 5 lymph nodes were negative.     Preop Questions 4/18/2021   1. Have you ever had a heart attack or stroke? No   2. Have you ever had surgery on your heart or blood vessels, such as a stent placement, a coronary artery bypass, or surgery on an artery in your head, neck, heart, or legs? No   3. Do you have chest pain with activity? No   4. Do you have a history  of  heart failure? No   5. Do you currently have a cold, bronchitis or symptoms of other infection? No   6. Do you have a cough, shortness of breath, or wheezing? No   7. Do you or anyone in your family have previous history of blood clots? No   8. Do you or does anyone in your family have a serious bleeding problem such as prolonged bleeding following surgeries or cuts? No   9. Have you ever had problems with anemia or been told to take iron pills? No   10. Have you had any abnormal blood loss such as black, tarry or bloody stools, or abnormal vaginal bleeding? No   11. Have you ever had a blood transfusion? No   12. Are you willing to have a blood transfusion if it is medically needed before, during, or after your surgery? Yes   13. Have you or any of your relatives ever had problems with anesthesia? No   14. Do you have sleep apnea, excessive snoring or daytime drowsiness? No   15. Do you have any artifical heart valves or other implanted medical devices like a pacemaker, defibrillator, or continuous glucose monitor? No   16. Do you have artificial joints? No   17. Are you allergic to latex? No   18. Is there any chance that you may be pregnant? No       Health Care Directive:  Patient does not have a Health Care Directive or Living Will: informal conversation with family; she has not yet competed a formal document    Preoperative Review of :   reviewed - received #10 Hydrocodone post operatively;   reviewed.       Status of Chronic Conditions:  New diagnosis of Papillary thyroid carcinoma     Review of Systems  CONSTITUTIONAL: NEGATIVE for fever, chills, change in weight  INTEGUMENTARY/SKIN: right breast surgery and post operative bruising improving,   EYES: NEGATIVE for vision changes or irritation  ENT/MOUTH: NEGATIVE for ear, mouth and throat problems  RESP: NEGATIVE for significant cough or SOB  BREAST: right breast surgery for lumpectomy done 4/7/2021 improving.   CV: NEGATIVE for chest pain,  palpitations or peripheral edema  GI: NEGATIVE for nausea, abdominal pain, heartburn, or change in bowel habits  : NEGATIVE for frequency, dysuria, or hematuria  MUSCULOSKELETAL: NEGATIVE for significant arthralgias or myalgia  NEURO: NEGATIVE for weakness, dizziness or paresthesias  ENDOCRINE: thyroid nodule with recent bx done 2021 at [place]- found to have Papillary carcinoma of the thyroid   HEME: NEGATIVE for bleeding problems  PSYCHIATRIC: NEGATIVE for changes in mood or affect    Patient Active Problem List    Diagnosis Date Noted     Malignant neoplasm of right breast (H) 2021     Priority: Medium     Added automatically from request for surgery [id number]        Past Medical History:   Diagnosis Date     Cancer (H)      Thyroid disease      Past Surgical History:   Procedure Laterality Date     C  DELIVERY ONLY      , Low Cervical     C/SECTION, LOW TRANSVERSE      , Low Transverse     LUMPECTOMY BREAST, SEED LOCALIZATION, SENTINEL NODE Right 2021    Procedure: RIGHT BREAST SEED LOCALIZED LUMPECTOMY, RIGHT AXILLARY SENTINEL NODE BIOPSY;  Surgeon: Kale Quarles MD;  Location: [facility] OR     ORTHOPEDIC SURGERY       SURGICAL HISTORY OF -       ACL Repair     SURGICAL HISTORY OF -       oral surgery, benign lesion     Current Outpatient Medications   Medication Sig Dispense Refill     HYDROcodone-acetaminophen (NORCO) 5-325 MG tablet Take 1-2 tablets by mouth every 4 hours as needed for moderate to severe pain 10 tablet 0     levonorgestrel (MIRENA, 52 MG,) 20 MCG/24HR IUD          Allergies   Allergen Reactions     No Known Drug Allergies         Social History     Tobacco Use     Smoking status: Never Smoker     Smokeless tobacco: Never Used   Substance Use Topics     Alcohol use: Yes     Comment: 2 drinks per week     Family History   Problem Relation Age of Onset     Lipids Maternal Grandmother      Hypertension Maternal Grandmother      Lipids Paternal  Grandfather      Lipids Father      Cancer Paternal Grandmother         liver and colon     Lipids Paternal Grandmother         ??     Diabetes Paternal Grandmother         adult onset     History   Drug Use No         Objective     BP 94/60 (BP Location: Right arm, Patient Position: Sitting, Cuff Size: Adult Regular)   Pulse 66   Temp 97.5  F (36.4  C) (Oral)   Resp 12   Wt 75.8 kg (167 lb)   SpO2 99%   BMI 26.16 kg/m      Physical Exam    GENERAL APPEARANCE: healthy, alert and no distress     EYES: EOMI, PERRL     HENT: ear canals and TM's normal and nose and mouth without ulcers or lesions     NECK: thyroid nodule nontender;  No bruit      RESP: lungs clear to auscultation - no rales, rhonchi or wheezes     CV: regular rates and rhythm, normal S1 S2, no S3 or S4 and no murmur, click or rub     ABDOMEN:  soft, nontender, no HSM or masses and bowel sounds normal     MS: extremities normal- no gross deformities noted, no evidence of inflammation in joints, FROM in all extremities.     SKIN: no suspicious lesions or rashes     NEURO: Normal strength and tone, sensory exam grossly normal, mentation intact and speech normal     PSYCH: mentation appears normal. and affect normal/bright    Recent Labs   Lab Test 02/23/21  1320   HGB 12.6         POTASSIUM 3.2*   CR 0.67        Diagnostics:  Recent Results (from the past 720 hour(s))   Asymptomatic COVID-19 Virus (Coronavirus) by PCR    Collection Time: 04/05/21 12:00 PM    Specimen: Nasopharyngeal   Result Value Ref Range    COVID-19 Virus PCR to [facility] - Source Nasopharyngeal     COVID-19 Virus PCR to [facility] - Result       Test received-See reflex to IDDL test SARS CoV2 (COVID-19) Virus RT-PCR   SARS-CoV-2 COVID-19 Virus (Coronavirus) by PCR    Collection Time: 04/05/21 12:00 PM    Specimen: Nasopharyngeal   Result Value Ref Range    SARS-CoV-2 Virus Specimen Source Nasopharyngeal     SARS-CoV-2 PCR Result NEGATIVE     SARS-CoV-2 PCR Comment        Testing was performed using the soraida SARS-CoV-2 assay on the soraida 6800 System.2   HCG qualitative urine    Collection Time: 04/07/21  7:15 AM   Result Value Ref Range    HCG Qual Urine Negative NEG^Negative   Surgical pathology exam    Collection Time: 04/07/21 10:12 AM   Result Value Ref Range    Copath Report       Patient Name: MIR PACHECO  MR#: [id number]  Specimen #: [id number]  Collected: 4/7/2021  Received: 4/7/2021  Reported: 4/9/2021 15:17  Ordering Phy(s): ANNE CLOUD    For improved result formatting, select 'View Enhanced Report Format' under   Linked Documents section.    SPECIMEN(S):  A: Breast lumpectomy, right  B: [place] lymph node, right axillary #1  C: Lymph nodes, right axilla  D: [place] lymph node, right axillary #2  E: [place] lymph node, right axillary #3  F: [place] lymph node, right axillary #4  G: Re-excision superior right breast  H: Re-excision lateral right breast    FINAL DIAGNOSIS:  A, G and H.  Breast, right, radioactive seed localized lumpectomy and   reexcision of superior and lateral  margins:    -Tumor Site: 9:00 (per prior biopsy [id number]).    -Tumor Type: Invasive ductal carcinoma.    -[place] Grade: Histologic grade 1.    - [place] Score (tubule formation + nuclear pleomorphism + mitotic   activity):  2 + 2 + 1= score 5.    -Invasive Tu mor Size: 15 mm.    -In-Situ Tumor: Ductal carcinoma in situ, micropapillary and cribriform,   low and intermediate nuclear grades.                : Lobular carcinoma in situ, classic and pleomorphic, focal.    -Lymph-Vascular Invasion: Not identified.    -Tumor Extent (Skin/Skeletal Muscle Involvement): No skin or skeletal   muscle present for evaluation.    -MARGINS: Negative.  -Invasive Carcinoma: Margins negative for invasive carcinoma.  Invasive   carcinoma at 4 mm from medial margin,  5 mm from posterior margin, 8 mm from inferior margin and 10 mm or greater   from all other margins.    -In-situ Carcinoma: Margins  negative for ductal carcinoma in situ.  Ductal   carcinoma in situ at 1 mm from  posterior margin, 1 mm from medial margin, 2 mm from anterior margin, 7 mm   from inferior margin and 10 mm or  greater from all other margins.    Pleomorphic lobular carcinoma in situ at 10 mm or greater from all   margins.    -LYMPH NODES:  -Total # of Lymph Nodes Examined:  5 (sentinel n odes - specimens B, D, E   and F: 4 and non-sentinel node -  specimen C: 1).  -Number of Lymph Nodes with metastatic tumor:  0  -Number with Macrometastases:  0  -Number with Micrometastases:  0  -Number with Isolated Tumor Cell Clusters:  0  -Size of Largest Metastatic Deposit: Not applicable.  -Extranodal Extension: Not applicable.    -ADDITIONAL PATHOLOGIC FINDINGS: Changes of prior biopsy site,   proliferative fibrocystic changes, multiple  foci of columnar cell change/hyperplasia, multiple foci of columnar cell   change with atypia (flat epithelial  atypia), multiple foci of atypical ductal hyperplasia, radial scars and   intraductal papilloma.    PATHOLOGIC STAGING (pTNM):  pT1c, N0 (SN), M N/A.    -HER-2/Neeru Testing by Fluorescence In-Situ Hybridization (FISH): Negative   (Group 5).  Performed on original needle biopsy specimen ([id number]).  See prior report   for details.    -Estrogen and Progesterone Receptor Study by Immunohistochemistry:  ER: Positive; DE: Positive.  Performe d on original needle biopsy specimen ([id number]).  See prior report   for details.    B.  Lymph node, right axillary/sentinel node #1, biopsy:  - No evidence of malignancy (0/1).    C.  Lymph node, right axillary/non-sentinel node, biopsy:  - No evidence of malignancy (0/1).    D.  Lymph node, right axillary/sentinel node #2, biopsy:  - No evidence of malignancy (0/1).    E.  Lymph node, right axillary/sentinel node #3, biopsy:  - No evidence of malignancy (0/1).    F.  Lymph node, right axillary/sentinel node #4,, biopsy:  - No evidence of malignancy  "(0/1).    Electronically signed out by:    Luis Cervantes M.D.    GROSS:  A. The specimen is received fresh for intraoperative consultation with the   proper patient identification,  labeled \"right breast lumpectomy\". The specimen consists of a 5.7 cm   (anterior to posterior) by 4.5 cm  (lateral to medial) by 3.1 cm (superior to inferior) fibrofatty lumpectomy   specimen. A short stitch designates  superior and a long stitch designa tanisha lateral. The specimen is inked as   follows:    Green - Anterior  Black - Posterior  Red - Superior  Blue - Inferior  Orange - Lateral  Yellow - Medial    The specimen is serially sectioned from anterior to posterior into 11   slices (averaging 0.5 cm in thickness  each). Located within slice 4 is a 0.5 cm previous biopsy site. Within the   previous I see site is a silver  cylindrical radioactive seed. The radioactive seed is returned to the   breast center. Surrounding the previous  biopsy site is an ill-defined 1.5 x 1.5 x 1.4 cm rubbery to firm probable   mass. Surrounding the mass is  extensively indurated fibrous tissue, therefore definitive measurements of   the mass is indeterminant. The  previous biopsy site is located 0.2 cm from the superior margin, 2.1 cm   from the inferior margin, 1.0 cm from  the medial margin, 1.1 cm from the lateral margin, 1.1 cm from the   anterior margin and over 2 cm from the  posterior margin. The prominence of the indurated fibrous tissue and    probable mass is located approximately  0.2 cm from the anterior-lateral margin. The remaining specimen is   comprised of approximately 60% indurated  fibrous tissue and approximately 40% yellow lobulated adipose tissue.   Gross intraoperative margins are  performed and reported back to the submitting surgeon. The specimen is   entirely submitted as follows:    A1-A2-slice 1, anterior margin, perpendicular  A3-A4-slice 2, bisected  A5-A6-slice 3, bisected  A7-A9-slice 4, trisected  E87-C90-jszkg " "5, quadrisected  H16-V54-sownq 6, trisected  E97-Q74-ovhpk 7, trisected (prominence of potential mass)  I59-O17-acjit 8, trisected  J46-S44-fienc 9, trisected  A10-Q37-ysvgw 10, bisected  V40-Q17-tcoid 11, posterior margin, perpendicular    Time specimen collected: 1012  Time in formalin: 1105    B. The specimen is received in formalin with the proper patient   identification, labeled \"right axillary  sentinel node #1\". The specimen consists of a focally blue stained 0.8 cm   candidate lymph  node. The lymph node  is bisected and entirely submitted in one cassette.    Time specimen collected: 1047  Time in formalin: 1055    C. The specimen is received in formalin with the proper patient   identification, labeled \"right axilla\". The  specimen consists of 2.1 cm candidate lymph node. The lymph node is   serially sectioned and entirely submitted  in 3 cassettes.    Time specimen collected: 1047  Time in formalin: 1055    D. The specimen is received in formalin with the proper patient   identification, labeled \"right axillary  sentinel node #2\". The specimen consists of a 0.9 cm blue stain candidate   lymph node. The lymph node is  trisected and entirely submitted in one cassette.    Time specimen collected: 1048  Time in formalin: 1055    E. The specimen is received in formalin with the proper patient   identification, labeled \"right axillary  sentinel node #3\". The specimen consists of a 0.8 cm candidate lymph node.   The lymph node is bisected and  entirely submitted in one  cassette.    Time specimen collected: 1048  Time in formalin: 1055    F. The specimen is received in formalin with the proper patient   identification, labeled \"right axillary  sentinel lymph node #4\". The specimen consists of a 0.4 cm candidate lymph   node. The lymph node is bisected  and entirely submitted in one cassette.    Time specimen collected: 1048  Time in formalin: 1055    G. The specimen is received in formalin with the proper " "patient   information, labeled \"reexcision superior  right breast, stitch marks new margin\". The specimen consists of a 3.1 x   2.1 x 1.0 cm portion of fibrofatty  tissue. A stitch is located on one surface designated as the new superior   margin. The surface containing the  stitch is inked red and the opposite surface is inked black. Sectioning   the specimen reveals dense gray-white  fibrous tissue. No definitive mass is identified. The specimen is entirely   submitted in 4 cassettes.    Time specimen collected: 1102  Time in formalin: 1126    H. T he specimen is received in formalin with the proper patient   information, labeled \"reexcision lateral right  breast, stitch marks new margin\". The specimen consists of a 3.5 x 2.6 x   1.4 cm fibrofatty portion of tissue.  A stitch is located on one surface designated as the new lateral margin.   The surface containing the stitch is  inked orange, and the opposite surface is inked black. Sectioning the   specimen reveals predominantly grossly  unremarkable yellow lobulated adipose tissue. The specimen is entirely   submitted in 5 cassettes.    Time specimen collected: 1106  Time in formalin: 1126 (Dictated by: CHARLES Prince 4/7/2021 01:22 PM)    INTRAOPERATIVE CONSULTATION:  Gross consultation:  A.  Right breast lumpectomy, gross margins: Biopsy site close to superior   and lateral margins (MGP).    MICROSCOPIC:  A, G and H.  Microscopic examination was performed.    B through F.  Serial sections of the specimens show five lymph nodes.    There is no evidence of malignancy.    The  technical component of this testing was completed at the [facility], with the professional component performed   at the [facility], [address], [place]  [address]   ([phone])    CPT Codes:  A: 72992-LX9, 12068-NZR  B: 42036-CX1  C: 01962-WU4  D: 53073-LT4  E: 72518-TG8  F: " 50031-YQ1  73514-BQ4  H: 23787-DB9    COLLECTION SITE:  Client: [facility]  Location: [facility] (R)        No EKG required, no history of coronary heart disease, significant arrhythmia, peripheral arterial disease or other structural heart disease.    Revised Cardiac Risk Index (RCRI):  The patient has the following serious cardiovascular risks for perioperative complications:   - No serious cardiac risks = 0 points     RCRI Interpretation: 0 points: Class I (very low risk - 0.4% complication rate)           Signed Electronically by: Ayesha Gomez MD  Copy of this evaluation report is provided to requesting physician.

## 2021-04-22 ENCOUNTER — OFFICE VISIT (OUTPATIENT)
Dept: SURGERY | Facility: CLINIC | Age: 46
End: 2021-04-22
Payer: COMMERCIAL

## 2021-04-22 VITALS
SYSTOLIC BLOOD PRESSURE: 108 MMHG | BODY MASS INDEX: 26.21 KG/M2 | HEIGHT: 67 IN | RESPIRATION RATE: 16 BRPM | OXYGEN SATURATION: 98 % | HEART RATE: 68 BPM | DIASTOLIC BLOOD PRESSURE: 60 MMHG | WEIGHT: 167 LBS

## 2021-04-22 DIAGNOSIS — Z09 SURGICAL FOLLOWUP VISIT: Primary | ICD-10-CM

## 2021-04-22 PROCEDURE — 99024 POSTOP FOLLOW-UP VISIT: CPT | Performed by: SURGERY

## 2021-04-22 ASSESSMENT — MIFFLIN-ST. JEOR: SCORE: 1430.14

## 2021-04-22 NOTE — PROGRESS NOTES
Re:  Niki Gurrola- 1975    Dear Dr. Gomez,    I had the pleasure of seeing Niki today in follow-up for her right breast lumpectomy and sentinel node biopsy on 4/7/21. The patient tolerated the procedure well. She is having some tightness in her arm with overhead extension but is otherwise feeling okay. We reviewed the pathology again today which showed a 1.5cm grade I ductal carcinoma with four negative sentinel nodes. As previously discussed, she had DCIS within 1mm of two margins.     On exam, the patient's incisions are healing well without signs of infection. There is some slight bruising in evolution but no appreciable seroma.    Niki is doing very well postoperatively. She is awaiting her Oncotype score at present. As previously discussed and recommended, we will proceed with margin re-excision on 4/28/21.      Sincerely,       Kale Healy MD      Please route or send letter to:  Primary Care Provider (PCP)  Dr Radha Russo

## 2021-04-22 NOTE — LETTER
2021    RE: Niki Gurrola, : 1975       I had the pleasure of seeing Niki today in follow-up for her right breast lumpectomy and sentinel node biopsy on 21. The patient tolerated the procedure well. She is having some tightness in her arm with overhead extension but is otherwise feeling okay. We reviewed the pathology again today which showed a 1.5cm grade I ductal carcinoma with four negative sentinel nodes. As previously discussed, she had DCIS within 1mm of two margins.      On exam, the patient's incisions are healing well without signs of infection. There is some slight bruising in evolution but no appreciable seroma.     Niki is doing very well postoperatively. She is awaiting her Oncotype score at present. As previously discussed and recommended, we will proceed with margin re-excision on 21.      Sincerely,         Kale Healy MD

## 2021-04-26 ENCOUNTER — TRANSFERRED RECORDS (OUTPATIENT)
Dept: SURGERY | Facility: CLINIC | Age: 46
End: 2021-04-26

## 2021-04-26 DIAGNOSIS — Z11.59 ENCOUNTER FOR SCREENING FOR OTHER VIRAL DISEASES: ICD-10-CM

## 2021-04-26 PROCEDURE — U0005 INFEC AGEN DETEC AMPLI PROBE: HCPCS | Performed by: SURGERY

## 2021-04-26 PROCEDURE — U0003 INFECTIOUS AGENT DETECTION BY NUCLEIC ACID (DNA OR RNA); SEVERE ACUTE RESPIRATORY SYNDROME CORONAVIRUS 2 (SARS-COV-2) (CORONAVIRUS DISEASE [COVID-19]), AMPLIFIED PROBE TECHNIQUE, MAKING USE OF HIGH THROUGHPUT TECHNOLOGIES AS DESCRIBED BY CMS-2020-01-R: HCPCS | Performed by: SURGERY

## 2021-04-27 PROCEDURE — 19301 PARTIAL MASTECTOMY: CPT | Mod: 78 | Performed by: SURGERY

## 2021-04-28 ENCOUNTER — APPOINTMENT (OUTPATIENT)
Dept: SURGERY | Facility: PHYSICIAN GROUP | Age: 46
End: 2021-04-28
Payer: COMMERCIAL

## 2021-04-28 ENCOUNTER — ANESTHESIA EVENT (OUTPATIENT)
Dept: SURGERY | Facility: CLINIC | Age: 46
End: 2021-04-28
Payer: COMMERCIAL

## 2021-04-28 ENCOUNTER — HOSPITAL ENCOUNTER (OUTPATIENT)
Facility: CLINIC | Age: 46
Discharge: HOME | End: 2021-04-28
Attending: SURGERY | Admitting: SURGERY
Payer: COMMERCIAL

## 2021-04-28 ENCOUNTER — SURGERY (OUTPATIENT)
Age: 46
End: 2021-04-28
Payer: COMMERCIAL

## 2021-04-28 ENCOUNTER — ANESTHESIA (OUTPATIENT)
Dept: SURGERY | Facility: CLINIC | Age: 46
End: 2021-04-28
Payer: COMMERCIAL

## 2021-04-28 VITALS
BODY MASS INDEX: 24.8 KG/M2 | SYSTOLIC BLOOD PRESSURE: 106 MMHG | TEMPERATURE: 97.8 F | WEIGHT: 158 LBS | OXYGEN SATURATION: 98 % | RESPIRATION RATE: 16 BRPM | HEIGHT: 67 IN | HEART RATE: 75 BPM | DIASTOLIC BLOOD PRESSURE: 59 MMHG

## 2021-04-28 DIAGNOSIS — C50.911 MALIGNANT NEOPLASM OF RIGHT BREAST (H): ICD-10-CM

## 2021-04-28 LAB — HCG UR QL: NEGATIVE

## 2021-04-28 PROCEDURE — 250N000013 HC RX MED GY IP 250 OP 250 PS 637: Performed by: SURGERY

## 2021-04-28 PROCEDURE — 710N000009 HC RECOVERY PHASE 1, LEVEL 1, PER MIN: Performed by: SURGERY

## 2021-04-28 PROCEDURE — 81025 URINE PREGNANCY TEST: CPT | Performed by: ANESTHESIOLOGY

## 2021-04-28 PROCEDURE — 258N000003 HC RX IP 258 OP 636: Performed by: ANESTHESIOLOGY

## 2021-04-28 PROCEDURE — 250N000011 HC RX IP 250 OP 636: Performed by: PHYSICIAN ASSISTANT

## 2021-04-28 PROCEDURE — 250N000009 HC RX 250: Performed by: ANESTHESIOLOGY

## 2021-04-28 PROCEDURE — 370N000017 HC ANESTHESIA TECHNICAL FEE, PER MIN: Performed by: SURGERY

## 2021-04-28 PROCEDURE — 272N000001 HC OR GENERAL SUPPLY STERILE: Performed by: SURGERY

## 2021-04-28 PROCEDURE — 88305 TISSUE EXAM BY PATHOLOGIST: CPT | Mod: 26

## 2021-04-28 PROCEDURE — 250N000011 HC RX IP 250 OP 636: Performed by: NURSE ANESTHETIST, CERTIFIED REGISTERED

## 2021-04-28 PROCEDURE — 88305 TISSUE EXAM BY PATHOLOGIST: CPT | Mod: TC | Performed by: SURGERY

## 2021-04-28 PROCEDURE — 999N000141 HC STATISTIC PRE-PROCEDURE NURSING ASSESSMENT: Performed by: SURGERY

## 2021-04-28 PROCEDURE — 360N000076 HC SURGERY LEVEL 3, PER MIN: Performed by: SURGERY

## 2021-04-28 PROCEDURE — 250N000011 HC RX IP 250 OP 636: Performed by: ANESTHESIOLOGY

## 2021-04-28 PROCEDURE — 710N000012 HC RECOVERY PHASE 2, PER MINUTE: Performed by: SURGERY

## 2021-04-28 PROCEDURE — 250N000009 HC RX 250: Performed by: SURGERY

## 2021-04-28 RX ORDER — NALOXONE HYDROCHLORIDE 0.4 MG/ML
0.4 INJECTION, SOLUTION INTRAMUSCULAR; INTRAVENOUS; SUBCUTANEOUS
Status: DISCONTINUED | OUTPATIENT
Start: 2021-04-28 | End: 2021-04-28 | Stop reason: HOSPADM

## 2021-04-28 RX ORDER — MAGNESIUM HYDROXIDE 1200 MG/15ML
LIQUID ORAL PRN
Status: DISCONTINUED | OUTPATIENT
Start: 2021-04-28 | End: 2021-04-28 | Stop reason: HOSPADM

## 2021-04-28 RX ORDER — SODIUM CHLORIDE, SODIUM LACTATE, POTASSIUM CHLORIDE, CALCIUM CHLORIDE 600; 310; 30; 20 MG/100ML; MG/100ML; MG/100ML; MG/100ML
INJECTION, SOLUTION INTRAVENOUS CONTINUOUS
Status: DISCONTINUED | OUTPATIENT
Start: 2021-04-28 | End: 2021-04-28 | Stop reason: HOSPADM

## 2021-04-28 RX ORDER — HYDROCODONE BITARTRATE AND ACETAMINOPHEN 5; 325 MG/1; MG/1
1 TABLET ORAL
Status: COMPLETED | OUTPATIENT
Start: 2021-04-28 | End: 2021-04-28

## 2021-04-28 RX ORDER — FENTANYL CITRATE 50 UG/ML
INJECTION, SOLUTION INTRAMUSCULAR; INTRAVENOUS PRN
Status: DISCONTINUED | OUTPATIENT
Start: 2021-04-28 | End: 2021-04-28

## 2021-04-28 RX ORDER — LIDOCAINE 40 MG/G
CREAM TOPICAL
Status: DISCONTINUED | OUTPATIENT
Start: 2021-04-28 | End: 2021-04-28 | Stop reason: HOSPADM

## 2021-04-28 RX ORDER — ONDANSETRON 4 MG/1
4 TABLET, ORALLY DISINTEGRATING ORAL EVERY 30 MIN PRN
Status: DISCONTINUED | OUTPATIENT
Start: 2021-04-28 | End: 2021-04-28 | Stop reason: HOSPADM

## 2021-04-28 RX ORDER — MEPERIDINE HYDROCHLORIDE 25 MG/ML
12.5 INJECTION INTRAMUSCULAR; INTRAVENOUS; SUBCUTANEOUS
Status: DISCONTINUED | OUTPATIENT
Start: 2021-04-28 | End: 2021-04-28 | Stop reason: HOSPADM

## 2021-04-28 RX ORDER — ALBUTEROL SULFATE 0.83 MG/ML
2.5 SOLUTION RESPIRATORY (INHALATION) EVERY 4 HOURS PRN
Status: DISCONTINUED | OUTPATIENT
Start: 2021-04-28 | End: 2021-04-28 | Stop reason: HOSPADM

## 2021-04-28 RX ORDER — HYDRALAZINE HYDROCHLORIDE 20 MG/ML
2.5-5 INJECTION INTRAMUSCULAR; INTRAVENOUS EVERY 10 MIN PRN
Status: DISCONTINUED | OUTPATIENT
Start: 2021-04-28 | End: 2021-04-28 | Stop reason: HOSPADM

## 2021-04-28 RX ORDER — CEFAZOLIN SODIUM 2 G/100ML
2 INJECTION, SOLUTION INTRAVENOUS
Status: DISCONTINUED | OUTPATIENT
Start: 2021-04-28 | End: 2021-04-28 | Stop reason: HOSPADM

## 2021-04-28 RX ORDER — FENTANYL CITRATE 50 UG/ML
25-50 INJECTION, SOLUTION INTRAMUSCULAR; INTRAVENOUS
Status: DISCONTINUED | OUTPATIENT
Start: 2021-04-28 | End: 2021-04-28 | Stop reason: HOSPADM

## 2021-04-28 RX ORDER — CEFAZOLIN SODIUM 2 G/100ML
2 INJECTION, SOLUTION INTRAVENOUS SEE ADMIN INSTRUCTIONS
Status: DISCONTINUED | OUTPATIENT
Start: 2021-04-28 | End: 2021-04-28 | Stop reason: HOSPADM

## 2021-04-28 RX ORDER — BUPIVACAINE HYDROCHLORIDE 5 MG/ML
INJECTION, SOLUTION EPIDURAL; INTRACAUDAL PRN
Status: DISCONTINUED | OUTPATIENT
Start: 2021-04-28 | End: 2021-04-28 | Stop reason: HOSPADM

## 2021-04-28 RX ORDER — LABETALOL HYDROCHLORIDE 5 MG/ML
10 INJECTION, SOLUTION INTRAVENOUS
Status: DISCONTINUED | OUTPATIENT
Start: 2021-04-28 | End: 2021-04-28 | Stop reason: HOSPADM

## 2021-04-28 RX ORDER — DEXAMETHASONE SODIUM PHOSPHATE 4 MG/ML
INJECTION, SOLUTION INTRA-ARTICULAR; INTRALESIONAL; INTRAMUSCULAR; INTRAVENOUS; SOFT TISSUE PRN
Status: DISCONTINUED | OUTPATIENT
Start: 2021-04-28 | End: 2021-04-28

## 2021-04-28 RX ORDER — DIPHENHYDRAMINE HYDROCHLORIDE 50 MG/ML
12.5 INJECTION INTRAMUSCULAR; INTRAVENOUS ONCE
Status: COMPLETED | OUTPATIENT
Start: 2021-04-28 | End: 2021-04-28

## 2021-04-28 RX ORDER — ONDANSETRON 2 MG/ML
4 INJECTION INTRAMUSCULAR; INTRAVENOUS EVERY 30 MIN PRN
Status: DISCONTINUED | OUTPATIENT
Start: 2021-04-28 | End: 2021-04-28 | Stop reason: HOSPADM

## 2021-04-28 RX ORDER — PROPOFOL 10 MG/ML
INJECTION, EMULSION INTRAVENOUS PRN
Status: DISCONTINUED | OUTPATIENT
Start: 2021-04-28 | End: 2021-04-28

## 2021-04-28 RX ORDER — HYDROMORPHONE HYDROCHLORIDE 1 MG/ML
.3-.5 INJECTION, SOLUTION INTRAMUSCULAR; INTRAVENOUS; SUBCUTANEOUS EVERY 10 MIN PRN
Status: DISCONTINUED | OUTPATIENT
Start: 2021-04-28 | End: 2021-04-28 | Stop reason: HOSPADM

## 2021-04-28 RX ORDER — NALOXONE HYDROCHLORIDE 0.4 MG/ML
0.2 INJECTION, SOLUTION INTRAMUSCULAR; INTRAVENOUS; SUBCUTANEOUS
Status: DISCONTINUED | OUTPATIENT
Start: 2021-04-28 | End: 2021-04-28 | Stop reason: HOSPADM

## 2021-04-28 RX ORDER — ONDANSETRON 2 MG/ML
INJECTION INTRAMUSCULAR; INTRAVENOUS PRN
Status: DISCONTINUED | OUTPATIENT
Start: 2021-04-28 | End: 2021-04-28

## 2021-04-28 RX ORDER — TAMSULOSIN HYDROCHLORIDE 0.4 MG/1
0.4 CAPSULE ORAL
Status: DISCONTINUED | OUTPATIENT
Start: 2021-04-28 | End: 2021-04-28 | Stop reason: HOSPADM

## 2021-04-28 RX ADMIN — HYDROCODONE BITARTRATE AND ACETAMINOPHEN 1 TABLET: 5; 325 TABLET ORAL at 11:29

## 2021-04-28 RX ADMIN — SODIUM CHLORIDE, POTASSIUM CHLORIDE, SODIUM LACTATE AND CALCIUM CHLORIDE: 600; 310; 30; 20 INJECTION, SOLUTION INTRAVENOUS at 09:48

## 2021-04-28 RX ADMIN — LIDOCAINE HYDROCHLORIDE 50 MG: 10 INJECTION, SOLUTION EPIDURAL; INFILTRATION; INTRACAUDAL; PERINEURAL at 09:53

## 2021-04-28 RX ADMIN — DEXAMETHASONE SODIUM PHOSPHATE 4 MG: 4 INJECTION, SOLUTION INTRA-ARTICULAR; INTRALESIONAL; INTRAMUSCULAR; INTRAVENOUS; SOFT TISSUE at 09:53

## 2021-04-28 RX ADMIN — PROPOFOL 140 MG: 10 INJECTION, EMULSION INTRAVENOUS at 09:53

## 2021-04-28 RX ADMIN — MIDAZOLAM 2 MG: 1 INJECTION INTRAMUSCULAR; INTRAVENOUS at 09:49

## 2021-04-28 RX ADMIN — HYDROCODONE BITARTRATE AND ACETAMINOPHEN 60 ML: 500; 5 TABLET ORAL at 10:16

## 2021-04-28 RX ADMIN — CEFAZOLIN SODIUM 2 G: 2 INJECTION, SOLUTION INTRAVENOUS at 10:00

## 2021-04-28 RX ADMIN — HYDROMORPHONE HYDROCHLORIDE 0.5 MG: 1 INJECTION, SOLUTION INTRAMUSCULAR; INTRAVENOUS; SUBCUTANEOUS at 11:28

## 2021-04-28 RX ADMIN — BUPIVACAINE HYDROCHLORIDE 20 ML: 5 INJECTION, SOLUTION EPIDURAL; INTRACAUDAL at 10:15

## 2021-04-28 RX ADMIN — FENTANYL CITRATE 100 MCG: 50 INJECTION, SOLUTION INTRAMUSCULAR; INTRAVENOUS at 09:53

## 2021-04-28 RX ADMIN — DIPHENHYDRAMINE HYDROCHLORIDE 12.5 MG: 50 INJECTION INTRAMUSCULAR; INTRAVENOUS at 10:58

## 2021-04-28 RX ADMIN — ONDANSETRON HYDROCHLORIDE 4 MG: 2 INJECTION, SOLUTION INTRAVENOUS at 10:17

## 2021-04-28 ASSESSMENT — MIFFLIN-ST. JEOR
SCORE: 1389.31
SCORE: 1411.99

## 2021-04-28 NOTE — ANESTHESIA POSTPROCEDURE EVALUATION
Patient: Niki Gurrola    Procedure(s):  REEXCISION RIGHT  BREAST LUMPECTOMY MARGIN    Diagnosis:Malignant neoplasm of right breast (H) [C50.911]  Diagnosis Additional Information: No value filed.    Anesthesia Type:  General    Note:  Disposition: Outpatient   Postop Pain Control: Uneventful            Sign Out: Well controlled pain   PONV: No   Neuro/Psych: Uneventful            Sign Out: Acceptable/Baseline neuro status   Airway/Respiratory: Uneventful            Sign Out: Acceptable/Baseline resp. status   CV/Hemodynamics: Uneventful            Sign Out: Acceptable CV status   Other NRE: NONE   DID A NON-ROUTINE EVENT OCCUR? No           Last vitals:  Vitals:    04/28/21 1140 04/28/21 1200 04/28/21 1239   BP:  109/73 106/59   Pulse: 77 64 75   Resp: 18 16 16   Temp:  97.8  F (36.6  C) 97.8  F (36.6  C)   SpO2: 99% 99% 98%       Last vitals prior to Anesthesia Care Transfer:  CRNA VITALS  4/28/2021 1004 - 4/28/2021 1104      4/28/2021             Pulse:  84    SpO2:  100 %    Resp Rate (observed):  (!) 2          Electronically Signed By: Duran Gaxiola MD  April 28, 2021  2:10 PM

## 2021-04-28 NOTE — BRIEF OP NOTE
[facility]    Brief Operative Note    Pre-operative diagnosis: Malignant neoplasm of right breast (H) [C50.911]  Post-operative diagnosis Right breast cancer    Procedure: Procedure(s):  REEXCISION RIGHT  BREAST LUMPECTOMY MARGIN  Surgeon: Surgeon(s) and Role:     * Kale Quarles MD - Primary     * Jeanne Quevedo PA-C - Assisting  Anesthesia: General   Estimated blood loss: Less than 10 ml  Drains: None  Specimens:   ID Type Source Tests Collected by Time Destination   A : re excision right breast posterior margins, suture marks new margin Tissue Breast, Right SURGICAL PATHOLOGY EXAM Kale Quarles MD 4/28/2021 10:12 AM    B : re excision right breast medial margin, suture marks new margins Tissue Breast, Right SURGICAL PATHOLOGY EXAM Kale Quarles MD 4/28/2021 10:09 AM      Findings:   Margins re-excised without gross findings..  Complications: None.  Implants: * No implants in log *

## 2021-04-28 NOTE — ANESTHESIA PREPROCEDURE EVALUATION
Anesthesia Pre-Procedure Evaluation    Patient: Niki Gurrola   MRN: [id number] : 1975        Preoperative Diagnosis: Malignant neoplasm of right breast (H) [C50.911]   Procedure : Procedure(s):  RIGHT BREAST SEED LOCALIZED LUMPECTOMY, RIGHT AXILLARY SENTINEL NODE BIOPSY     Past Medical History:   Diagnosis Date     Cancer (H)      Thyroid disease       Past Surgical History:   Procedure Laterality Date     C  DELIVERY ONLY      , Low Cervical     C/SECTION, LOW TRANSVERSE      , Low Transverse     LUMPECTOMY BREAST, SEED LOCALIZATION, SENTINEL NODE Right 2021    Procedure: RIGHT BREAST SEED LOCALIZED LUMPECTOMY, RIGHT AXILLARY SENTINEL NODE BIOPSY;  Surgeon: Kale Quarles MD;  Location: [facility]     ORTHOPEDIC SURGERY       SURGICAL HISTORY OF -       ACL Repair     SURGICAL HISTORY OF -       oral surgery, benign lesion      Allergies   Allergen Reactions     No Known Drug Allergies       Social History     Tobacco Use     Smoking status: Never Smoker     Smokeless tobacco: Never Used   Substance Use Topics     Alcohol use: Yes     Comment: 2 drinks per week      Wt Readings from Last 1 Encounters:   21 71.7 kg (158 lb)        Anesthesia Evaluation   Pt has had prior anesthetic. Type: General.    No history of anesthetic complications       ROS/MED HX  ENT/Pulmonary:  - neg pulmonary ROS  (-) sleep apnea   Neurologic:       Cardiovascular:  - neg cardiovascular ROS     METS/Exercise Tolerance:     Hematologic:       Musculoskeletal:       GI/Hepatic:    (-) GERD   Renal/Genitourinary:       Endo:     (+) thyroid problem,     Psychiatric/Substance Use:       Infectious Disease:       Malignancy:   (+) Malignancy, History of Breast.    Other:            Physical Exam    Airway        Mallampati: II   TM distance: > 3 FB   Neck ROM: full     Respiratory Devices and Support         Dental           Cardiovascular          Rhythm and rate: regular and  normal     Pulmonary           breath sounds clear to auscultation           OUTSIDE LABS:  CBC:   Lab Results   Component Value Date    WBC 8.9 02/23/2021    WBC 9.2 04/06/2012    HGB 12.6 02/23/2021    HGB 11.6 (L) 04/06/2012    HCT 39.7 02/23/2021    HCT 35.4 04/06/2012     02/23/2021     04/06/2012     BMP:   Lab Results   Component Value Date     02/23/2021     04/06/2012    POTASSIUM 3.2 (L) 02/23/2021    POTASSIUM 3.8 04/06/2012    CHLORIDE 106 02/23/2021    CHLORIDE 104 04/06/2012    CO2 28 02/23/2021    CO2 25 04/06/2012    BUN 11 02/23/2021    BUN 11 04/06/2012    CR 0.67 02/23/2021    CR 0.72 04/06/2012     (H) 02/23/2021    GLC 72 04/06/2012     COAGS: No results found for: PTT, INR, FIBR  POC:   Lab Results   Component Value Date    HCG Negative 04/28/2021     HEPATIC:   Lab Results   Component Value Date    ALBUMIN 3.4 02/23/2021    PROTTOTAL 6.8 02/23/2021    ALT 19 02/23/2021    AST 14 02/23/2021    ALKPHOS 52 02/23/2021    BILITOTAL 0.4 02/23/2021     OTHER:   Lab Results   Component Value Date    PAM 8.6 02/23/2021       Anesthesia Plan    ASA Status:  2   NPO Status:  NPO Appropriate    Anesthesia Type: General.     - Airway: LMA   Induction: Intravenous.   Maintenance: Balanced.        Consents    Anesthesia Plan(s) and associated risks, benefits, and realistic alternatives discussed. Questions answered and patient/representative(s) expressed understanding.     - Discussed with:  Patient      - Extended Intubation/Ventilatory Support Discussed: No.      - Patient is DNR/DNI Status: No    Use of blood products discussed: No .     Postoperative Care    Pain management: IV analgesics, Oral pain medications, Multi-modal analgesia.   PONV prophylaxis: Ondansetron (or other 5HT-3), Dexamethasone or Solumedrol     Comments:                    Duran Gaxiola MD

## 2021-04-28 NOTE — DISCHARGE INSTRUCTIONS
HOME CARE FOLLOWING LUMPECTOMY  CHIDI Parker, FRANNY Mcbride R. O Donnell, J. Shaheen    APPOINTMENT WITH YOUR SURGEON:  All patients are to schedule a post-op appointment with their general surgeon.  Once you are home, call the office to schedule the appointment for 2-3 weeks from the date of your surgery.  You may need to be seen sooner if you have a drain in place.      Appointments to see your general surgeon at any of our locations can be made by calling:  #[phone]    You will receive a phone call from your surgeon with these results.  You will be able to ask questions and receive more in-depth explanation at your post-op appointment.  This appointment will also be when you discuss further evaluation, treatment, and referral to oncology and/or radiation oncology if this is necessary.  Occasionally special testing must be done on the surgical specimen which may delay the posting of your final pathology report.  You may call for your final pathology report after 1p.m. three working days after surgery to check on its status.    SUPPORT:  Wear a bra for support and comfort for 3-7 days, day and night.    DRAIN:  If you have a drain in place, you will need a separate appointment with a nurse in the office to have this removed.  You will have a form to keep track of the output of your drain.  When the output reaches a point where the total for a 24 hour period is less than 30cc s, you are ready to have the drain removed.  At that point you can call the office and talk to the nurse to arrange coming into the office to have this done.  If you have more than one drain in place, they may not all be removed at the same time, as the outputs can be very different from each.  The nurse will help you to manage this and help decide when the remaining drains will be taken out.    INCISIONAL CARE:    If you have a dressing in place, keep clean and dry for 48 hours; you may replace the gauze if it  becomes soiled.    After 48 hours you may remove the dressing and shower.  Do not submerse incision in water for 1 week.    If you have a Dermabond dressing (a type of skin glue), you may shower immediately.    Sutures will absorb and do not need to be removed.    If present, leave the steri-strips (white paper tapes) in place for 14 days after surgery.    If present, leave Dermabond glue in place until it wears/flakes off.    You may expect a small amount of drainage from your incision.    A lump/ridge under the incision is normal and will gradually resolve.    BATHING:  You are allowed to bath (shallow water in a tub only) or shower 24-48 hours after your surgery.  Mild soap is OK to use near these sites.  When bathing, do not allow the incision or drain site to become submersed in water as this may increase the risk of infection.    ACTIVITY:  Cautiously resume exercise and strenuous activities such as jogging, tennis, aerobics, etc. Also, be careful of stretching activities with operative side for two weeks.    If you had a  sentinel node biopsy  at the time of your surgery:  You have no restrictions in addition to those noted above.    If you had an  axillary node dissection  at the time of your surgery:  You are recommended to restrict the activity of the arm on the side the dissection was done on.  This means:  no reaching overhead until cleared to do so by your surgeon, no carrying weight on that side greater than a couple pounds, no injections/vaccinations/ blood samples from that side, and no blood pressure measurements on that side.  It is also recommended to elevate the arm on pillows several times a day to decrease/minimize swelling, and avoid sleeping on that arm.    DIET:  No restrictions.  Increased fluid intake is recommended. While taking pain medications, increase dietary fiber or add a fiber supplementation like Metamucil or Citrucel to help prevent constipation - a possible side effect of pain  medications.    DISCOMFORT:  Local anesthetic placed at surgery should provide relief for 4-8 hours.  Begin taking pain pills before discomfort is severe.  Take the pain medication with some food, when possible, to minimize side effects.  Intermittent use of ice packs may help during the first 48 hours.  Expect gradual improvement.    RETURN APPOINTMENT:  Schedule a follow-up visit 2-3 weeks post-op.  Office Phone:  [phone]     CONTACT US IF THE FOLLOWING DEVELOPS:   1. A fever that is above 101     2. If there is a large amount of drainage, bleeding, or swelling.   3. Severe pain that is not relieved by your prescription.   4. Drainage that is thick, cloudy, yellow, green or white.   5. Any other questions not answered by  Frequently Asked Questions  sheet.      FREQUENTLY ASKED QUESTIONS:    Q:  How should my incision look?    A:  Normally your incision will appear slightly swollen with light redness directly along the incision itself as it heals.  It may feel like a bump or ridge as the healing/scarring happens, and over time (3-4 months) this bump or ridge feeling should slowly go away.  In general, clear or pink watery drainage can be normal at first as your incision heals, but should decrease over time.    Q:  How do I know if my incision is infected?  A:  Look at your incision for signs of infection, like redness around the incision spreading to surrounding skin, or drainage of cloudy or foul-smelling drainage.  If you feel warm, check your temperature to see if you are running a fever.    **If any of these things occur, please notify the nurse at our office.  We may need you to come into the office for an incision check.      Q:  How do I take care of my incision?  A:  If you have a dressing in place - Starting the day after surgery, replace the dressing 1-2 times a day until there is no further drainage from the incision.  At that time, a dressing is no longer needed.  Try to minimize tape on the skin  if irritation is occurring at the tape sites.  If you have significant irritation from tape on the skin, please call the office to discuss other method of dressing your incision.    Small pieces of tape called  steri-strips  may be present directly overlying your incision; these may be removed 10 days after surgery unless otherwise specified by your surgeon.  If these tapes start to loosen at the ends, you may trim them back until they fall off or are removed.    A:  If you had  Dermabond  tissue glue used as a dressing (this causes your incision to look shiny with a clear covering over it) - This type of dressing wears off with time and does not require more dressings over the top unless it is draining around the glue as it wears off.  Do not apply ointments or lotions over the incisions until the glue has completely worn off.    Q:  There is a piece of tape or a sticky  lead  still on my skin.  Can I remove this?  A:  Sometimes the sticky  leads  used for monitoring during surgery or for evaluation in the emergency department are not all removed while you are in the hospital.  These sometimes have a tab or metal dot on them.  You can easily remove these on your own, like taking off a band-aid.  If there is a gel substance under the  lead , simply wipe/clean it off with a washcloth or paper towel.      Q:  What can I do to minimize constipation (very hard stools, or lack of stools)?  A:  Stay well hydrated.  Increase your dietary fiber intake or take a fiber supplement -with plenty of water.  Walk around frequently.  You may consider an over-the-counter stool-softener.  Your Pharmacist can assist you with choosing one that is stocked at your pharmacy.  Constipation is also one of the most common side effects of pain medication.  If you are using pain medication, be pro-active and try to PREVENT problems with constipation by taking the steps above BEFORE constipation becomes a problem.    Q:  What do I do if I need  more pain medications?  A:  Call the office to receive refills.  Be aware that certain pain meds cannot be called into a pharmacy and actually require a paper prescription.  A change may be made in your pain med as you progress thru your recovery period or if you have side effects to certain meds.    --Pain meds are NOT refilled after 5pm on weekdays, and NOT AT ALL on the weekends, so please look ahead to prevent problems.      Q:  Why am I having a hard time sleeping now that I am at home?  A:  Many medications you receive while you are in the hospital can impact your sleep for a number of days after your surgery/hospitalization.  Decreased level of activity and naps during the day may also make sleeping at night difficult.  Try to minimize day-time naps, and get up frequently during the day to walk around your home during your recovery time.  Sleep aides may be of some help, but are not recommended for long-term use.      Q:  I am having some back discomfort.  What should I do?  A:  This may be related to certain positioning that was required for your surgery, extended periods of time in bed, or other changes in your overall activity level.  You may try ice, heat, acetaminophen, or ibuprofen to treat this temporarily.  Note that many pain medications have acetaminophen in them and would state this on the prescription bottle.  Be sure not to exceed the maximum of 4000mg per day of acetaminophen.     **If the pain you are having does not resolve, is severe, or is a flare of back pain you have had on other occasions prior to surgery, please contact your primary physician for further recommendations or for an appointment to be examined at their office.    Q:  Why am I having headaches?  A:  Headaches can be caused by many things:  caffeine withdrawal, use of pain meds, dehydration, high blood pressure, lack of sleep, over-activity/exhaustion, flare-up of usual migraine headaches.  If you feel this is related to  muscle tension (a band-like feeling around the head, or a pressure at the low-back of the head) you may try ice or heat to this area.  You may need to drink more fluids (try electrolyte drink like Gatorade), rest, or take your usual migraine medications.   **If your headaches do not resolve, worsen, are accompanied by other symptoms, or if your blood pressure is high, please call your primary physician for recommendation and/or examination.    Q:  I am unable to urinate.  What do I do?  A:  A small percentage of people can have difficulty urinating initially after surgery.  This includes being able to urinate only a very small amount at a time and feeling discomfort or pressure in the very low abdomen.  This is called  urinary retention , and is actually an urgent situation.  Proceed to your nearest Emergency department for evaluation (not an Urgent Care Center).  Sometimes the bladder does not work correctly after certain medications you receive during surgery, or related to certain procedures.  You may need to have a catheter placed until your bladder recovers.  When planning to go to an Emergency department, it may help to call the ER to let them know you are coming in for this problem after a surgery.  This may help you get in quicker to be evaluated.  **If you have symptoms of a urinary tract infection, please contact your primary physician for the proper evaluation and treatment.          If you have other questions, please call the office Monday thru Friday between 8am and 5pm to discuss with the nurse or physician assistant.  #[phone]    There is a surgeon ON CALL on weekday evenings and over the weekend in case of urgent need only, and may be contacted at the same number.    If you are having an emergency, call 911 or proceed to your nearest emergency department.    GENERAL ANESTHESIA OR SEDATION ADULT DISCHARGE INSTRUCTIONS   SPECIAL PRECAUTIONS FOR 24 HOURS AFTER SURGERY    IT IS NOT UNUSUAL TO  FEEL LIGHT-HEADED OR FAINT, UP TO 24 HOURS AFTER SURGERY OR WHILE TAKING PAIN MEDICATION.  IF YOU HAVE THESE SYMPTOMS; SIT FOR A FEW MINUTES BEFORE STANDING AND HAVE SOMEONE ASSIST YOU WHEN YOU GET UP TO WALK OR USE THE BATHROOM.    YOU SHOULD REST AND RELAX FOR THE NEXT 24 HOURS AND YOU MUST MAKE ARRANGEMENTS TO HAVE SOMEONE STAY WITH YOU FOR AT LEAST 24 HOURS AFTER YOUR DISCHARGE.  AVOID HAZARDOUS AND STRENUOUS ACTIVITIES.  DO NOT MAKE IMPORTANT DECISIONS FOR 24 HOURS.    DO NOT DRIVE ANY VEHICLE OR OPERATE MECHANICAL EQUIPMENT FOR 24 HOURS FOLLOWING THE END OF YOUR SURGERY.  EVEN THOUGH YOU MAY FEEL NORMAL, YOUR REACTIONS MAY BE AFFECTED BY THE MEDICATION YOU HAVE RECEIVED.    DO NOT DRINK ALCOHOLIC BEVERAGES FOR 24 HOURS FOLLOWING YOUR SURGERY.    DRINK CLEAR LIQUIDS (APPLE JUICE, GINGER ALE, 7-UP, BROTH, ETC.).  PROGRESS TO YOUR REGULAR DIET AS YOU FEEL ABLE.    YOU MAY HAVE A DRY MOUTH, A SORE THROAT, MUSCLES ACHES OR TROUBLE SLEEPING.  THESE SHOULD GO AWAY AFTER 24 HOURS.    CALL YOUR DOCTOR FOR ANY OF THE FOLLOWING:  SIGNS OF INFECTION (FEVER, GROWING TENDERNESS AT THE SURGERY SITE, A LARGE AMOUNT OF DRAINAGE OR BLEEDING, SEVERE PAIN, FOUL-SMELLING DRAINAGE, REDNESS OR SWELLING.    IT HAS BEEN OVER 8 TO 10 HOURS SINCE SURGERY AND YOU ARE STILL NOT ABLE TO URINATE (PASS WATER).         You had one norco (hydrocodone with tylenol) at 11:30am.    Maximum acetaminophen (Tylenol) dose from all sources should not exceed 4 grams (4000 mg) per day.

## 2021-04-28 NOTE — ANESTHESIA PROCEDURE NOTES
Airway       Patient location during procedure: OR       Procedure Start/Stop Times: 4/28/2021 9:52 AM  Staff -        CRNA: Marita Rutledge APRN CRNA       Performed By: CRNA  Consent for Airway        Urgency: elective  Indications and Patient Condition       Indications for airway management: ivy-procedural       Induction type:intravenous       Mask difficulty assessment: 0 - not attempted    Final Airway Details       Final airway type: supraglottic airway    Supraglottic Airway Details        Type: LMA       Brand: I-Gel       LMA size: 4    Post intubation assessment        Placement verified by: capnometry, equal breath sounds and chest rise        Number of attempts at approach: 1       Number of other approaches attempted: 0       Secured with: plastic tape       Ease of procedure: easy       Dentition: Intact and Unchanged

## 2021-04-28 NOTE — OP NOTE
Procedure Date: 04/28/2021    PREOPERATIVE DIAGNOSIS:  Right breast cancer.    POSTOPERATIVE DIAGNOSIS:  Right breast cancer.    PROCEDURE:  Reexcision right breast lumpectomy margins.    ANESTHESIA:  General plus local.    SURGEON:  Kale Healy MD    ASSISTANT:  Jeanne Quevedo PA-C.  The physician assistant was medically necessary for skills in suture cutting and suture exposure and suctioning throughout the operation.    SPECIMENS:   1.  Reexcision right breast medial margin, suture marks, new margin.  2.  Reexcision posterior margin, suture marks in new margin.     COMPLICATIONS:  None.    INDICATIONS FOR PROCEDURE:  Ms. Gurrola is a 45-year-old female under my care for right sided breast cancer who underwent a lumpectomy and sentinel node biopsy on 04/07/2021.  She had DCIS within a millimeter of her posterior and medial margins and I advised reexcision of these margins to reduce the recurrence risk.  Risks of the surgery were then outlined in detail for her.  These include infection, bleeding, harm to structures and need for further surgery and adjuvant treatments.  In the interim, the patient had a low risk Oncotype returned on her primary tumor.  The patient consented to the above procedure.  We reexcised the medial and posterior margins without gross findings.    DESCRIPTION OF PROCEDURE:  With the patient under excellent general anesthesia in supine position the right breast was prepped and draped out in the usual sterile surgical fashion.  A timeout was performed confirming the patient, procedure to be done as well as drug allergies and site markings.  She did receive a dose of Ancef for infection prophylaxis prior to beginning our procedure.  We began by utilizing her lumpectomy scar in the lateral aspect of the right breast and incised with a #15 blade.  Sharp dissection was carried out through the deeper fatty tissues until we entered the seroma cavity at the lumpectomy site.  This was aspirated  with suction and subsequently retractors were placed on the medial edge of the incision.  The medial margin was reexcised using electrocautery, taking approximately a 4 mm swath of tissue.  This was marked with a stitch at the new margin and submitted in formalin for routine handling.  We then performed a similar maneuver to the posterior aspect of the lumpectomy, there was some fibrofatty tissue adhered to the pectoral muscle as well as the prepectoral fascia, but not much significant breast tissue was in this area.  Again, the new swath of tissue was marked with a stitch at the new margin and submitted in formalin as a separate posterior margin.  We irrigated the lumpectomy site anesthetized it with 20 mL of 0.5% plain Marcaine.  We placed a new small clip at the medial aspect of the lumpectomy cavity.  Skin was closed with 3-0 and 4-0 Vicryl and skin glue was dressed atop of this.  The patient tolerated the procedure well.  She was extubated and brought to recovery in excellent condition.  All sharps and sponge counts were correct at the conclusion of the case.    Kale Cooney MD        D: 2021   T: 2021   MT: [id number]    Name:     MIR PACHECO  MRN:      [id number]        Account:        [id number]   :      1975           Procedure Date: 2021     Document: [id number]    cc:  MD Radha Guo MD

## 2021-04-28 NOTE — ANESTHESIA CARE TRANSFER NOTE
Patient: Niki Gurrola    Procedure(s):  REEXCISION RIGHT  BREAST LUMPECTOMY MARGIN    Diagnosis: Malignant neoplasm of right breast (H) [C50.911]  Diagnosis Additional Information: No value filed.    Anesthesia Type:   General     Note:    Oropharynx: oropharynx clear of all foreign objects  Level of Consciousness: awake  Oxygen Supplementation: face mask    Independent Airway: airway patency satisfactory and stable  Dentition: dentition unchanged  Vital Signs Stable: post-procedure vital signs reviewed and stable  Report to RN Given: handoff report given  Patient transferred to: PACU    Handoff Report: Identifed the Patient, Identified the Reponsible Provider, Reviewed the pertinent medical history, Discussed the surgical course, Reviewed Intra-OP anesthesia mangement and issues during anesthesia, Set expectations for post-procedure period and Allowed opportunity for questions and acknowledgement of understanding      Vitals: (Last set prior to Anesthesia Care Transfer)  CRNA VITALS  4/28/2021 1004 - 4/28/2021 1034      4/28/2021             Pulse:  84    SpO2:  100 %    Resp Rate (observed):  (!) 2        Electronically Signed By: SIERRA Harris CRNA  April 28, 2021  10:34 AM

## 2021-04-30 ENCOUNTER — TELEPHONE (OUTPATIENT)
Dept: SURGERY | Facility: CLINIC | Age: 46
End: 2021-04-30

## 2021-04-30 DIAGNOSIS — Z11.59 ENCOUNTER FOR SCREENING FOR OTHER VIRAL DISEASES: ICD-10-CM

## 2021-04-30 LAB — COPATH REPORT: NORMAL

## 2021-04-30 NOTE — LETTER
[facility]    [address], [address]  [place], [place] [address]  Phone [phone]  Fax [phone] [address], [address]  [facility] [address]  Phone [phone]  Fax [phone]    [email or web address]         Niki Gurrola      You have been scheduled for a COVID-19 testing appointment at [facility].    5-10-21 at 1:30 PM     The clinic is located at:  [address] [place], [place] [id number]    Please wear a mask or face covering to the testing site.      Following your testing, you will be required to self-isolate until your surgery.  If you need a note for your employer due to this, please let us know.

## 2021-04-30 NOTE — TELEPHONE ENCOUNTER
Type of surgery: RE-EXCISION RIGHT BREAST LUMPECTOMY MARGIN   Location of surgery: [facility]  Date and time of surgery: 5-13-21, 12:20 PM   Surgeon: DR. REYNOLDS   Pre-Op Appt Date: 4/1921   Post-Op Appt Date: PATIENT TO SCHEDULE    Packet sent out: Yes  Pre-cert/Authorization completed:  Not Applicable  Date: 4-30-21        RE-EXCISION RIGHT BREAST LUMPECTOMY MARGIN   GENERAL   H&P DONE BY DR. VALENZUELA 4/19/21  30 MINS REQ   PA ASSIST RMO   [facility]

## 2021-05-10 DIAGNOSIS — Z11.59 ENCOUNTER FOR SCREENING FOR OTHER VIRAL DISEASES: ICD-10-CM

## 2021-05-10 LAB
SARS-COV-2 RNA RESP QL NAA+PROBE: NORMAL
SPECIMEN SOURCE: NORMAL

## 2021-05-10 PROCEDURE — U0005 INFEC AGEN DETEC AMPLI PROBE: HCPCS | Performed by: SURGERY

## 2021-05-10 PROCEDURE — U0003 INFECTIOUS AGENT DETECTION BY NUCLEIC ACID (DNA OR RNA); SEVERE ACUTE RESPIRATORY SYNDROME CORONAVIRUS 2 (SARS-COV-2) (CORONAVIRUS DISEASE [COVID-19]), AMPLIFIED PROBE TECHNIQUE, MAKING USE OF HIGH THROUGHPUT TECHNOLOGIES AS DESCRIBED BY CMS-2020-01-R: HCPCS | Performed by: SURGERY

## 2021-05-13 ENCOUNTER — APPOINTMENT (OUTPATIENT)
Dept: SURGERY | Facility: PHYSICIAN GROUP | Age: 46
End: 2021-05-13
Payer: COMMERCIAL

## 2021-05-13 ENCOUNTER — ANESTHESIA EVENT (OUTPATIENT)
Dept: SURGERY | Facility: CLINIC | Age: 46
End: 2021-05-13
Payer: COMMERCIAL

## 2021-05-13 ENCOUNTER — HOSPITAL ENCOUNTER (OUTPATIENT)
Facility: CLINIC | Age: 46
Discharge: HOME | End: 2021-05-13
Attending: SURGERY | Admitting: SURGERY
Payer: COMMERCIAL

## 2021-05-13 ENCOUNTER — SURGERY (OUTPATIENT)
Age: 46
End: 2021-05-13
Payer: COMMERCIAL

## 2021-05-13 ENCOUNTER — ANESTHESIA (OUTPATIENT)
Dept: SURGERY | Facility: CLINIC | Age: 46
End: 2021-05-13
Payer: COMMERCIAL

## 2021-05-13 VITALS
TEMPERATURE: 97.6 F | WEIGHT: 165 LBS | HEART RATE: 62 BPM | BODY MASS INDEX: 25.01 KG/M2 | SYSTOLIC BLOOD PRESSURE: 104 MMHG | OXYGEN SATURATION: 100 % | DIASTOLIC BLOOD PRESSURE: 61 MMHG | HEIGHT: 68 IN | RESPIRATION RATE: 14 BRPM

## 2021-05-13 LAB — HCG UR QL: NEGATIVE

## 2021-05-13 PROCEDURE — 710N000009 HC RECOVERY PHASE 1, LEVEL 1, PER MIN: Performed by: SURGERY

## 2021-05-13 PROCEDURE — 999N000141 HC STATISTIC PRE-PROCEDURE NURSING ASSESSMENT: Performed by: SURGERY

## 2021-05-13 PROCEDURE — 250N000009 HC RX 250: Performed by: NURSE ANESTHETIST, CERTIFIED REGISTERED

## 2021-05-13 PROCEDURE — 81025 URINE PREGNANCY TEST: CPT | Performed by: ANESTHESIOLOGY

## 2021-05-13 PROCEDURE — 360N000076 HC SURGERY LEVEL 3, PER MIN: Performed by: SURGERY

## 2021-05-13 PROCEDURE — 88307 TISSUE EXAM BY PATHOLOGIST: CPT | Mod: TC | Performed by: SURGERY

## 2021-05-13 PROCEDURE — 258N000003 HC RX IP 258 OP 636: Performed by: NURSE ANESTHETIST, CERTIFIED REGISTERED

## 2021-05-13 PROCEDURE — 250N000011 HC RX IP 250 OP 636: Performed by: ANESTHESIOLOGY

## 2021-05-13 PROCEDURE — 250N000009 HC RX 250: Performed by: SURGERY

## 2021-05-13 PROCEDURE — 19301 PARTIAL MASTECTOMY: CPT | Mod: 58 | Performed by: SURGERY

## 2021-05-13 PROCEDURE — 370N000017 HC ANESTHESIA TECHNICAL FEE, PER MIN: Performed by: SURGERY

## 2021-05-13 PROCEDURE — 250N000011 HC RX IP 250 OP 636: Performed by: NURSE ANESTHETIST, CERTIFIED REGISTERED

## 2021-05-13 PROCEDURE — 272N000001 HC OR GENERAL SUPPLY STERILE: Performed by: SURGERY

## 2021-05-13 PROCEDURE — 250N000011 HC RX IP 250 OP 636: Performed by: SURGERY

## 2021-05-13 PROCEDURE — 710N000012 HC RECOVERY PHASE 2, PER MINUTE: Performed by: SURGERY

## 2021-05-13 PROCEDURE — 88307 TISSUE EXAM BY PATHOLOGIST: CPT | Mod: 26 | Performed by: PATHOLOGY

## 2021-05-13 RX ORDER — NALOXONE HYDROCHLORIDE 0.4 MG/ML
0.2 INJECTION, SOLUTION INTRAMUSCULAR; INTRAVENOUS; SUBCUTANEOUS
Status: DISCONTINUED | OUTPATIENT
Start: 2021-05-13 | End: 2021-05-13 | Stop reason: HOSPADM

## 2021-05-13 RX ORDER — ONDANSETRON 2 MG/ML
4 INJECTION INTRAMUSCULAR; INTRAVENOUS EVERY 30 MIN PRN
Status: DISCONTINUED | OUTPATIENT
Start: 2021-05-13 | End: 2021-05-13 | Stop reason: HOSPADM

## 2021-05-13 RX ORDER — CEFAZOLIN SODIUM 2 G/100ML
2 INJECTION, SOLUTION INTRAVENOUS SEE ADMIN INSTRUCTIONS
Status: DISCONTINUED | OUTPATIENT
Start: 2021-05-13 | End: 2021-05-13 | Stop reason: HOSPADM

## 2021-05-13 RX ORDER — ONDANSETRON 4 MG/1
4 TABLET, ORALLY DISINTEGRATING ORAL EVERY 30 MIN PRN
Status: DISCONTINUED | OUTPATIENT
Start: 2021-05-13 | End: 2021-05-13 | Stop reason: HOSPADM

## 2021-05-13 RX ORDER — BUPIVACAINE HYDROCHLORIDE AND EPINEPHRINE 5; 5 MG/ML; UG/ML
INJECTION, SOLUTION PERINEURAL PRN
Status: DISCONTINUED | OUTPATIENT
Start: 2021-05-13 | End: 2021-05-13 | Stop reason: HOSPADM

## 2021-05-13 RX ORDER — GLYCOPYRROLATE 0.2 MG/ML
INJECTION, SOLUTION INTRAMUSCULAR; INTRAVENOUS PRN
Status: DISCONTINUED | OUTPATIENT
Start: 2021-05-13 | End: 2021-05-13

## 2021-05-13 RX ORDER — NALOXONE HYDROCHLORIDE 0.4 MG/ML
0.4 INJECTION, SOLUTION INTRAMUSCULAR; INTRAVENOUS; SUBCUTANEOUS
Status: DISCONTINUED | OUTPATIENT
Start: 2021-05-13 | End: 2021-05-13 | Stop reason: HOSPADM

## 2021-05-13 RX ORDER — FENTANYL CITRATE 50 UG/ML
INJECTION, SOLUTION INTRAMUSCULAR; INTRAVENOUS PRN
Status: DISCONTINUED | OUTPATIENT
Start: 2021-05-13 | End: 2021-05-13

## 2021-05-13 RX ORDER — HYDROMORPHONE HYDROCHLORIDE 1 MG/ML
.3-.5 INJECTION, SOLUTION INTRAMUSCULAR; INTRAVENOUS; SUBCUTANEOUS EVERY 10 MIN PRN
Status: DISCONTINUED | OUTPATIENT
Start: 2021-05-13 | End: 2021-05-13 | Stop reason: HOSPADM

## 2021-05-13 RX ORDER — FENTANYL CITRATE 50 UG/ML
25-50 INJECTION, SOLUTION INTRAMUSCULAR; INTRAVENOUS
Status: DISCONTINUED | OUTPATIENT
Start: 2021-05-13 | End: 2021-05-13 | Stop reason: HOSPADM

## 2021-05-13 RX ORDER — CEFAZOLIN SODIUM 2 G/100ML
2 INJECTION, SOLUTION INTRAVENOUS
Status: DISCONTINUED | OUTPATIENT
Start: 2021-05-13 | End: 2021-05-13 | Stop reason: HOSPADM

## 2021-05-13 RX ORDER — HYDROCODONE BITARTRATE AND ACETAMINOPHEN 5; 325 MG/1; MG/1
1 TABLET ORAL
Status: DISCONTINUED | OUTPATIENT
Start: 2021-05-13 | End: 2021-05-13 | Stop reason: HOSPADM

## 2021-05-13 RX ORDER — ONDANSETRON 2 MG/ML
INJECTION INTRAMUSCULAR; INTRAVENOUS PRN
Status: DISCONTINUED | OUTPATIENT
Start: 2021-05-13 | End: 2021-05-13

## 2021-05-13 RX ORDER — PROPOFOL 10 MG/ML
INJECTION, EMULSION INTRAVENOUS PRN
Status: DISCONTINUED | OUTPATIENT
Start: 2021-05-13 | End: 2021-05-13

## 2021-05-13 RX ORDER — LIDOCAINE HYDROCHLORIDE 10 MG/ML
INJECTION, SOLUTION INFILTRATION; PERINEURAL PRN
Status: DISCONTINUED | OUTPATIENT
Start: 2021-05-13 | End: 2021-05-13

## 2021-05-13 RX ORDER — KETOROLAC TROMETHAMINE 30 MG/ML
30 INJECTION, SOLUTION INTRAMUSCULAR; INTRAVENOUS EVERY 6 HOURS PRN
Status: DISCONTINUED | OUTPATIENT
Start: 2021-05-13 | End: 2021-05-13 | Stop reason: HOSPADM

## 2021-05-13 RX ORDER — METOCLOPRAMIDE 10 MG/1
10 TABLET ORAL EVERY 6 HOURS PRN
Status: DISCONTINUED | OUTPATIENT
Start: 2021-05-13 | End: 2021-05-13 | Stop reason: HOSPADM

## 2021-05-13 RX ORDER — DIMENHYDRINATE 50 MG/ML
25 INJECTION, SOLUTION INTRAMUSCULAR; INTRAVENOUS
Status: DISCONTINUED | OUTPATIENT
Start: 2021-05-13 | End: 2021-05-13 | Stop reason: HOSPADM

## 2021-05-13 RX ORDER — SODIUM CHLORIDE, SODIUM LACTATE, POTASSIUM CHLORIDE, CALCIUM CHLORIDE 600; 310; 30; 20 MG/100ML; MG/100ML; MG/100ML; MG/100ML
INJECTION, SOLUTION INTRAVENOUS CONTINUOUS
Status: CANCELLED | OUTPATIENT
Start: 2021-05-13

## 2021-05-13 RX ORDER — SODIUM CHLORIDE, SODIUM LACTATE, POTASSIUM CHLORIDE, CALCIUM CHLORIDE 600; 310; 30; 20 MG/100ML; MG/100ML; MG/100ML; MG/100ML
INJECTION, SOLUTION INTRAVENOUS CONTINUOUS
Status: DISCONTINUED | OUTPATIENT
Start: 2021-05-13 | End: 2021-05-13 | Stop reason: HOSPADM

## 2021-05-13 RX ORDER — HYDRALAZINE HYDROCHLORIDE 20 MG/ML
2.5-5 INJECTION INTRAMUSCULAR; INTRAVENOUS EVERY 10 MIN PRN
Status: DISCONTINUED | OUTPATIENT
Start: 2021-05-13 | End: 2021-05-13 | Stop reason: HOSPADM

## 2021-05-13 RX ORDER — METOCLOPRAMIDE HYDROCHLORIDE 5 MG/ML
10 INJECTION INTRAMUSCULAR; INTRAVENOUS EVERY 6 HOURS PRN
Status: DISCONTINUED | OUTPATIENT
Start: 2021-05-13 | End: 2021-05-13 | Stop reason: HOSPADM

## 2021-05-13 RX ORDER — MEPERIDINE HYDROCHLORIDE 25 MG/ML
12.5 INJECTION INTRAMUSCULAR; INTRAVENOUS; SUBCUTANEOUS
Status: DISCONTINUED | OUTPATIENT
Start: 2021-05-13 | End: 2021-05-13 | Stop reason: HOSPADM

## 2021-05-13 RX ORDER — DEXAMETHASONE SODIUM PHOSPHATE 4 MG/ML
INJECTION, SOLUTION INTRA-ARTICULAR; INTRALESIONAL; INTRAMUSCULAR; INTRAVENOUS; SOFT TISSUE PRN
Status: DISCONTINUED | OUTPATIENT
Start: 2021-05-13 | End: 2021-05-13

## 2021-05-13 RX ORDER — LIDOCAINE 40 MG/G
CREAM TOPICAL
Status: CANCELLED | OUTPATIENT
Start: 2021-05-13

## 2021-05-13 RX ORDER — METOPROLOL TARTRATE 1 MG/ML
1-2 INJECTION, SOLUTION INTRAVENOUS EVERY 5 MIN PRN
Status: DISCONTINUED | OUTPATIENT
Start: 2021-05-13 | End: 2021-05-13 | Stop reason: HOSPADM

## 2021-05-13 RX ORDER — PROPOFOL 10 MG/ML
INJECTION, EMULSION INTRAVENOUS CONTINUOUS PRN
Status: DISCONTINUED | OUTPATIENT
Start: 2021-05-13 | End: 2021-05-13

## 2021-05-13 RX ORDER — SODIUM CHLORIDE, SODIUM LACTATE, POTASSIUM CHLORIDE, CALCIUM CHLORIDE 600; 310; 30; 20 MG/100ML; MG/100ML; MG/100ML; MG/100ML
INJECTION, SOLUTION INTRAVENOUS CONTINUOUS PRN
Status: DISCONTINUED | OUTPATIENT
Start: 2021-05-13 | End: 2021-05-13

## 2021-05-13 RX ADMIN — GLYCOPYRROLATE 0.1 MG: 0.2 INJECTION, SOLUTION INTRAMUSCULAR; INTRAVENOUS at 13:23

## 2021-05-13 RX ADMIN — MIDAZOLAM 2 MG: 1 INJECTION INTRAMUSCULAR; INTRAVENOUS at 13:18

## 2021-05-13 RX ADMIN — BUPIVACAINE HYDROCHLORIDE AND EPINEPHRINE BITARTRATE 20 ML: 5; .005 INJECTION, SOLUTION EPIDURAL; INTRACAUDAL; PERINEURAL at 13:48

## 2021-05-13 RX ADMIN — LIDOCAINE HYDROCHLORIDE 50 MG: 10 INJECTION, SOLUTION INFILTRATION; PERINEURAL at 13:27

## 2021-05-13 RX ADMIN — PROPOFOL 200 MG: 10 INJECTION, EMULSION INTRAVENOUS at 13:27

## 2021-05-13 RX ADMIN — FENTANYL CITRATE 100 MCG: 50 INJECTION, SOLUTION INTRAMUSCULAR; INTRAVENOUS at 13:27

## 2021-05-13 RX ADMIN — FENTANYL CITRATE 50 MCG: 50 INJECTION, SOLUTION INTRAMUSCULAR; INTRAVENOUS at 14:32

## 2021-05-13 RX ADMIN — CEFAZOLIN SODIUM 2 G: 2 INJECTION, SOLUTION INTRAVENOUS at 13:18

## 2021-05-13 RX ADMIN — ONDANSETRON HYDROCHLORIDE 4 MG: 2 INJECTION, SOLUTION INTRAVENOUS at 13:24

## 2021-05-13 RX ADMIN — DEXAMETHASONE SODIUM PHOSPHATE 4 MG: 4 INJECTION, SOLUTION INTRA-ARTICULAR; INTRALESIONAL; INTRAMUSCULAR; INTRAVENOUS; SOFT TISSUE at 13:27

## 2021-05-13 RX ADMIN — FENTANYL CITRATE 50 MCG: 50 INJECTION, SOLUTION INTRAMUSCULAR; INTRAVENOUS at 14:17

## 2021-05-13 RX ADMIN — SODIUM CHLORIDE, POTASSIUM CHLORIDE, SODIUM LACTATE AND CALCIUM CHLORIDE: 600; 310; 30; 20 INJECTION, SOLUTION INTRAVENOUS at 12:53

## 2021-05-13 RX ADMIN — GLYCOPYRROLATE 0.1 MG: 0.2 INJECTION, SOLUTION INTRAMUSCULAR; INTRAVENOUS at 13:38

## 2021-05-13 ASSESSMENT — MIFFLIN-ST. JEOR: SCORE: 1436.94

## 2021-05-13 NOTE — BRIEF OP NOTE
[facility]    Brief Operative Note    Pre-operative diagnosis: Malignant neoplasm of right breast (H) [C50.911]  Post-operative diagnosis Right breast cancer    Procedure: Procedure(s):  REEXCISION RIGHT  BREAST LUMPECTOMY MARGIN  Surgeon: Surgeon(s) and Role:     * Kale Quarles MD - Primary     * Cassie Thacker PA-C - Assisting  Anesthesia: General   Estimated blood loss: Less than 10 ml  Drains: None  Specimens:   ID Type Source Tests Collected by Time Destination   A : Reexcision right breast medial margin Tissue Breast, Right SURGICAL PATHOLOGY EXAM Kale Quarles MD 5/13/2021  1:00 PM      Findings:   No gross findings..  Complications: None.  Implants: * No implants in log *

## 2021-05-13 NOTE — ANESTHESIA CARE TRANSFER NOTE
Patient: Niki Gurrola    Procedure(s):  REEXCISION RIGHT  BREAST LUMPECTOMY MARGIN    Diagnosis: Malignant neoplasm of right breast (H) [C50.911]  Diagnosis Additional Information: No value filed.    Anesthesia Type:   General     Note:    Oropharynx: oropharynx clear of all foreign objects  Level of Consciousness: drowsy  Oxygen Supplementation: face mask  Level of Supplemental Oxygen (L/min / FiO2): 4  Independent Airway: airway patency satisfactory and stable  Dentition: dentition unchanged  Vital Signs Stable: post-procedure vital signs reviewed and stable  Report to RN Given: handoff report given  Patient transferred to: PACU    Handoff Report: Identifed the Patient, Identified the Reponsible Provider, Reviewed the pertinent medical history, Discussed the surgical course, Reviewed Intra-OP anesthesia mangement and issues during anesthesia, Set expectations for post-procedure period and Allowed opportunity for questions and acknowledgement of understanding      Vitals: (Last set prior to Anesthesia Care Transfer)  CRNA VITALS  5/13/2021 1327 - 5/13/2021 1402      5/13/2021             NIBP:  105/63    Pulse:  80    SpO2:  100 %    Resp Rate (observed):  18    EKG:  Sinus rhythm        Electronically Signed By: SIERRA Nunez CRNA  May 13, 2021  2:02 PM

## 2021-05-13 NOTE — ANESTHESIA PROCEDURE NOTES
Airway       Patient location during procedure: OR  Staff -        CRNA: Misti Ayers APRN CRNA       Performed By: CRNA  Consent for Airway        Urgency: elective  Indications and Patient Condition       Indications for airway management: ivy-procedural       Induction type:intravenous       Mask difficulty assessment: 1 - vent by mask    Final Airway Details       Final airway type: supraglottic airway    Supraglottic Airway Details        Type: LMA       Brand: I-Gel       LMA size: 4    Post intubation assessment        Placement verified by: capnometry, equal breath sounds and chest rise        Number of attempts at approach: 1       Secured with: plastic tape       Ease of procedure: easy       Dentition: Intact and Unchanged

## 2021-05-13 NOTE — DISCHARGE INSTRUCTIONS
HOME CARE FOLLOWING LUMPECTOMY  CHIDI Parker, FRANNY Mcbride R. O Donnell, J. Shaheen    APPOINTMENT WITH YOUR SURGEON:  All patients are to schedule a post-op appointment with their general surgeon.  Once you are home, call the office to schedule the appointment for 2-3 weeks from the date of your surgery.  You may need to be seen sooner if you have a drain in place.      Appointments to see your general surgeon at any of our locations can be made by calling:  #[phone]    You will receive a phone call from your surgeon with these results.  You will be able to ask questions and receive more in-depth explanation at your post-op appointment.  This appointment will also be when you discuss further evaluation, treatment, and referral to oncology and/or radiation oncology if this is necessary.  Occasionally special testing must be done on the surgical specimen which may delay the posting of your final pathology report.  You may call for your final pathology report after 1p.m. three working days after surgery to check on its status.    SUPPORT:  Wear a bra for support and comfort for 3-7 days, day and night.    DRAIN:  If you have a drain in place, you will need a separate appointment with a nurse in the office to have this removed.  You will have a form to keep track of the output of your drain.  When the output reaches a point where the total for a 24 hour period is less than 30cc s, you are ready to have the drain removed.  At that point you can call the office and talk to the nurse to arrange coming into the office to have this done.  If you have more than one drain in place, they may not all be removed at the same time, as the outputs can be very different from each.  The nurse will help you to manage this and help decide when the remaining drains will be taken out.    INCISIONAL CARE:    If you have a dressing in place, keep clean and dry for 48 hours; you may replace the gauze if it  becomes soiled.    After 48 hours you may remove the dressing and shower.  Do not submerse incision in water for 1 week.    If you have a Dermabond dressing (a type of skin glue), you may shower immediately.    Sutures will absorb and do not need to be removed.    If present, leave the steri-strips (white paper tapes) in place for 14 days after surgery.    If present, leave Dermabond glue in place until it wears/flakes off.    You may expect a small amount of drainage from your incision.    A lump/ridge under the incision is normal and will gradually resolve.    BATHING:  You are allowed to bath (shallow water in a tub only) or shower 24-48 hours after your surgery.  Mild soap is OK to use near these sites.  When bathing, do not allow the incision or drain site to become submersed in water as this may increase the risk of infection.    ACTIVITY:  Cautiously resume exercise and strenuous activities such as jogging, tennis, aerobics, etc. Also, be careful of stretching activities with operative side for two weeks.    If you had a  sentinel node biopsy  at the time of your surgery:  You have no restrictions in addition to those noted above.    If you had an  axillary node dissection  at the time of your surgery:  You are recommended to restrict the activity of the arm on the side the dissection was done on.  This means:  no reaching overhead until cleared to do so by your surgeon, no carrying weight on that side greater than a couple pounds, no injections/vaccinations/ blood samples from that side, and no blood pressure measurements on that side.  It is also recommended to elevate the arm on pillows several times a day to decrease/minimize swelling, and avoid sleeping on that arm.    DIET:  No restrictions.  Increased fluid intake is recommended. While taking pain medications, increase dietary fiber or add a fiber supplementation like Metamucil or Citrucel to help prevent constipation - a possible side effect of pain  medications.    DISCOMFORT:  Local anesthetic placed at surgery should provide relief for 4-8 hours.  Begin taking pain pills before discomfort is severe.  Take the pain medication with some food, when possible, to minimize side effects.  Intermittent use of ice packs may help during the first 48 hours.  Expect gradual improvement.    RETURN APPOINTMENT:  Schedule a follow-up visit 2-3 weeks post-op.  Office Phone:  [phone]     CONTACT US IF THE FOLLOWING DEVELOPS:   1. A fever that is above 101     2. If there is a large amount of drainage, bleeding, or swelling.   3. Severe pain that is not relieved by your prescription.   4. Drainage that is thick, cloudy, yellow, green or white.   5. Any other questions not answered by  Frequently Asked Questions  sheet.      FREQUENTLY ASKED QUESTIONS:    Q:  How should my incision look?    A:  Normally your incision will appear slightly swollen with light redness directly along the incision itself as it heals.  It may feel like a bump or ridge as the healing/scarring happens, and over time (3-4 months) this bump or ridge feeling should slowly go away.  In general, clear or pink watery drainage can be normal at first as your incision heals, but should decrease over time.    Q:  How do I know if my incision is infected?  A:  Look at your incision for signs of infection, like redness around the incision spreading to surrounding skin, or drainage of cloudy or foul-smelling drainage.  If you feel warm, check your temperature to see if you are running a fever.    **If any of these things occur, please notify the nurse at our office.  We may need you to come into the office for an incision check.      Q:  How do I take care of my incision?  A:  If you have a dressing in place - Starting the day after surgery, replace the dressing 1-2 times a day until there is no further drainage from the incision.  At that time, a dressing is no longer needed.  Try to minimize tape on the skin  if irritation is occurring at the tape sites.  If you have significant irritation from tape on the skin, please call the office to discuss other method of dressing your incision.    Small pieces of tape called  steri-strips  may be present directly overlying your incision; these may be removed 10 days after surgery unless otherwise specified by your surgeon.  If these tapes start to loosen at the ends, you may trim them back until they fall off or are removed.    A:  If you had  Dermabond  tissue glue used as a dressing (this causes your incision to look shiny with a clear covering over it) - This type of dressing wears off with time and does not require more dressings over the top unless it is draining around the glue as it wears off.  Do not apply ointments or lotions over the incisions until the glue has completely worn off.    Q:  There is a piece of tape or a sticky  lead  still on my skin.  Can I remove this?  A:  Sometimes the sticky  leads  used for monitoring during surgery or for evaluation in the emergency department are not all removed while you are in the hospital.  These sometimes have a tab or metal dot on them.  You can easily remove these on your own, like taking off a band-aid.  If there is a gel substance under the  lead , simply wipe/clean it off with a washcloth or paper towel.      Q:  What can I do to minimize constipation (very hard stools, or lack of stools)?  A:  Stay well hydrated.  Increase your dietary fiber intake or take a fiber supplement -with plenty of water.  Walk around frequently.  You may consider an over-the-counter stool-softener.  Your Pharmacist can assist you with choosing one that is stocked at your pharmacy.  Constipation is also one of the most common side effects of pain medication.  If you are using pain medication, be pro-active and try to PREVENT problems with constipation by taking the steps above BEFORE constipation becomes a problem.    Q:  What do I do if I need  more pain medications?  A:  Call the office to receive refills.  Be aware that certain pain meds cannot be called into a pharmacy and actually require a paper prescription.  A change may be made in your pain med as you progress thru your recovery period or if you have side effects to certain meds.    --Pain meds are NOT refilled after 5pm on weekdays, and NOT AT ALL on the weekends, so please look ahead to prevent problems.      Q:  Why am I having a hard time sleeping now that I am at home?  A:  Many medications you receive while you are in the hospital can impact your sleep for a number of days after your surgery/hospitalization.  Decreased level of activity and naps during the day may also make sleeping at night difficult.  Try to minimize day-time naps, and get up frequently during the day to walk around your home during your recovery time.  Sleep aides may be of some help, but are not recommended for long-term use.      Q:  I am having some back discomfort.  What should I do?  A:  This may be related to certain positioning that was required for your surgery, extended periods of time in bed, or other changes in your overall activity level.  You may try ice, heat, acetaminophen, or ibuprofen to treat this temporarily.  Note that many pain medications have acetaminophen in them and would state this on the prescription bottle.  Be sure not to exceed the maximum of 4000mg per day of acetaminophen.     **If the pain you are having does not resolve, is severe, or is a flare of back pain you have had on other occasions prior to surgery, please contact your primary physician for further recommendations or for an appointment to be examined at their office.    Q:  Why am I having headaches?  A:  Headaches can be caused by many things:  caffeine withdrawal, use of pain meds, dehydration, high blood pressure, lack of sleep, over-activity/exhaustion, flare-up of usual migraine headaches.  If you feel this is related to  muscle tension (a band-like feeling around the head, or a pressure at the low-back of the head) you may try ice or heat to this area.  You may need to drink more fluids (try electrolyte drink like Gatorade), rest, or take your usual migraine medications.   **If your headaches do not resolve, worsen, are accompanied by other symptoms, or if your blood pressure is high, please call your primary physician for recommendation and/or examination.    Q:  I am unable to urinate.  What do I do?  A:  A small percentage of people can have difficulty urinating initially after surgery.  This includes being able to urinate only a very small amount at a time and feeling discomfort or pressure in the very low abdomen.  This is called  urinary retention , and is actually an urgent situation.  Proceed to your nearest Emergency department for evaluation (not an Urgent Care Center).  Sometimes the bladder does not work correctly after certain medications you receive during surgery, or related to certain procedures.  You may need to have a catheter placed until your bladder recovers.  When planning to go to an Emergency department, it may help to call the ER to let them know you are coming in for this problem after a surgery.  This may help you get in quicker to be evaluated.  **If you have symptoms of a urinary tract infection, please contact your primary physician for the proper evaluation and treatment.          If you have other questions, please call the office Monday thru Friday between 8am and 5pm to discuss with the nurse or physician assistant.  #[phone]    There is a surgeon ON CALL on weekday evenings and over the weekend in case of urgent need only, and may be contacted at the same number.    If you are having an emergency, call 911 or proceed to your nearest emergency department.      GENERAL ANESTHESIA OR SEDATION ADULT DISCHARGE INSTRUCTIONS   SPECIAL PRECAUTIONS FOR 24 HOURS AFTER SURGERY    IT IS NOT UNUSUAL TO  FEEL LIGHT-HEADED OR FAINT, UP TO 24 HOURS AFTER SURGERY OR WHILE TAKING PAIN MEDICATION.  IF YOU HAVE THESE SYMPTOMS; SIT FOR A FEW MINUTES BEFORE STANDING AND HAVE SOMEONE ASSIST YOU WHEN YOU GET UP TO WALK OR USE THE BATHROOM.    YOU SHOULD REST AND RELAX FOR THE NEXT 24 HOURS AND YOU MUST MAKE ARRANGEMENTS TO HAVE SOMEONE STAY WITH YOU FOR AT LEAST 24 HOURS AFTER YOUR DISCHARGE.  AVOID HAZARDOUS AND STRENUOUS ACTIVITIES.  DO NOT MAKE IMPORTANT DECISIONS FOR 24 HOURS.    DO NOT DRIVE ANY VEHICLE OR OPERATE MECHANICAL EQUIPMENT FOR 24 HOURS FOLLOWING THE END OF YOUR SURGERY.  EVEN THOUGH YOU MAY FEEL NORMAL, YOUR REACTIONS MAY BE AFFECTED BY THE MEDICATION YOU HAVE RECEIVED.    DO NOT DRINK ALCOHOLIC BEVERAGES FOR 24 HOURS FOLLOWING YOUR SURGERY.    DRINK CLEAR LIQUIDS (APPLE JUICE, GINGER ALE, 7-UP, BROTH, ETC.).  PROGRESS TO YOUR REGULAR DIET AS YOU FEEL ABLE.    YOU MAY HAVE A DRY MOUTH, A SORE THROAT, MUSCLES ACHES OR TROUBLE SLEEPING.  THESE SHOULD GO AWAY AFTER 24 HOURS.    CALL YOUR DOCTOR FOR ANY OF THE FOLLOWING:  SIGNS OF INFECTION (FEVER, GROWING TENDERNESS AT THE SURGERY SITE, A LARGE AMOUNT OF DRAINAGE OR BLEEDING, SEVERE PAIN, FOUL-SMELLING DRAINAGE, REDNESS OR SWELLING.    IT HAS BEEN OVER 8 TO 10 HOURS SINCE SURGERY AND YOU ARE STILL NOT ABLE TO URINATE (PASS WATER).     Maximum acetaminophen (Tylenol) dose from all sources should not exceed 4 grams (4000 mg) per day.   Norco contains tylenol 325 mg.

## 2021-05-13 NOTE — ANESTHESIA PREPROCEDURE EVALUATION
Anesthesia Pre-Procedure Evaluation    Patient: Niki Gurrola   MRN: [id number] : 1975        Preoperative Diagnosis: Malignant neoplasm of right breast (H) [C50.911]   Procedure : Procedure(s):  REEXCISION RIGHT  BREAST LUMPECTOMY MARGIN     Past Medical History:   Diagnosis Date     Cancer (H)      Thyroid disease       Past Surgical History:   Procedure Laterality Date     C  DELIVERY ONLY      , Low Cervical     C/SECTION, LOW TRANSVERSE      , Low Transverse     LUMPECTOMY BREAST Right 2021    Procedure: REEXCISION RIGHT  BREAST LUMPECTOMY MARGIN;  Surgeon: Kale Quarles MD;  Location: [facility] OR     LUMPECTOMY BREAST, SEED LOCALIZATION, SENTINEL NODE Right 2021    Procedure: RIGHT BREAST SEED LOCALIZED LUMPECTOMY, RIGHT AXILLARY SENTINEL NODE BIOPSY;  Surgeon: Kale Quarles MD;  Location: [facility] OR     ORTHOPEDIC SURGERY       SURGICAL HISTORY OF -       ACL Repair     SURGICAL HISTORY OF -       oral surgery, benign lesion      Allergies   Allergen Reactions     No Known Drug Allergies       Social History     Tobacco Use     Smoking status: Never Smoker     Smokeless tobacco: Never Used   Substance Use Topics     Alcohol use: Yes     Comment: 2 drinks per week      Wt Readings from Last 1 Encounters:   21 74.8 kg (165 lb)        Anesthesia Evaluation   Pt has had prior anesthetic. Type: General.        ROS/MED HX  ENT/Pulmonary:  - neg pulmonary ROS     Neurologic:  - neg neurologic ROS     Cardiovascular:  - neg cardiovascular ROS     METS/Exercise Tolerance:     Hematologic: Comments: Lab Test        21                       1320          WBC          8.9           HGB          12.6          MCV          94            PLT          260            Lab Test        21                       1320          NA           138           POTASSIUM    3.2*          CHLORIDE     106           CO2          28            BUN          11             CR           0.67          ANIONGAP     4             PAM          8.6           GLC          132*           - neg hematologic  ROS     Musculoskeletal:  - neg musculoskeletal ROS     GI/Hepatic:  - neg GI/hepatic ROS     Renal/Genitourinary:  - neg Renal ROS     Endo:     (+) thyroid problem, hypothyroidism,     Psychiatric/Substance Use:  - neg psychiatric ROS     Infectious Disease:  - neg infectious disease ROS     Malignancy:   (+) Malignancy, History of Breast.Breast CA Active status post Surgery.        Other:  - neg other ROS          Physical Exam    Airway        Mallampati: II   TM distance: > 3 FB   Neck ROM: full   Mouth opening: > 3 cm    Respiratory Devices and Support         Dental  no notable dental history         Cardiovascular   cardiovascular exam normal          Pulmonary   pulmonary exam normal                OUTSIDE LABS:  CBC:   Lab Results   Component Value Date    WBC 8.9 02/23/2021    WBC 9.2 04/06/2012    HGB 12.6 02/23/2021    HGB 11.6 (L) 04/06/2012    HCT 39.7 02/23/2021    HCT 35.4 04/06/2012     02/23/2021     04/06/2012     BMP:   Lab Results   Component Value Date     02/23/2021     04/06/2012    POTASSIUM 3.2 (L) 02/23/2021    POTASSIUM 3.8 04/06/2012    CHLORIDE 106 02/23/2021    CHLORIDE 104 04/06/2012    CO2 28 02/23/2021    CO2 25 04/06/2012    BUN 11 02/23/2021    BUN 11 04/06/2012    CR 0.67 02/23/2021    CR 0.72 04/06/2012     (H) 02/23/2021    GLC 72 04/06/2012     COAGS: No results found for: PTT, INR, FIBR  POC:   Lab Results   Component Value Date    HCG Negative 05/13/2021     HEPATIC:   Lab Results   Component Value Date    ALBUMIN 3.4 02/23/2021    PROTTOTAL 6.8 02/23/2021    ALT 19 02/23/2021    AST 14 02/23/2021    ALKPHOS 52 02/23/2021    BILITOTAL 0.4 02/23/2021     OTHER:   Lab Results   Component Value Date    PAM 8.6 02/23/2021       Anesthesia Plan    ASA Status:  2      Anesthesia Type: General.     - Airway: LMA    Induction: Propofol, Intravenous.   Maintenance: Balanced.        Consents    Anesthesia Plan(s) and associated risks, benefits, and realistic alternatives discussed. Questions answered and patient/representative(s) expressed understanding.     - Discussed with:  Patient      - Extended Intubation/Ventilatory Support Discussed: No.      - Patient is DNR/DNI Status: No    Use of blood products discussed: Yes.     - Discussed with: Patient.     - Consented: consented to blood products            Reason for refusal: other.     Postoperative Care    Pain management: IV analgesics.   PONV prophylaxis: Ondansetron (or other 5HT-3), Dexamethasone or Solumedrol     Comments:                Percy Oviedo MD

## 2021-05-13 NOTE — ANESTHESIA POSTPROCEDURE EVALUATION
Patient: Niki Gurrola    Procedure(s):  REEXCISION RIGHT  BREAST LUMPECTOMY MARGIN    Diagnosis:Malignant neoplasm of right breast (H) [C50.911]  Diagnosis Additional Information: No value filed.    Anesthesia Type:  General    Note:     Postop Pain Control: Uneventful            Sign Out: Well controlled pain   PONV: No   Neuro/Psych: Uneventful            Sign Out: Acceptable/Baseline neuro status   Airway/Respiratory: Uneventful            Sign Out: Acceptable/Baseline resp. status   CV/Hemodynamics: Uneventful            Sign Out: Acceptable CV status; No obvious hypovolemia; No obvious fluid overload   Other NRE: NONE   DID A NON-ROUTINE EVENT OCCUR? No           Last vitals:  Vitals:    05/13/21 1430 05/13/21 1445 05/13/21 1500   BP: 115/64 120/61 109/64   Pulse: 84 67 65   Resp: 26 16 16   Temp:   97.5  F (36.4  C)   SpO2: 99% 100% 100%       Last vitals prior to Anesthesia Care Transfer:  CRNA VITALS  5/13/2021 1327 - 5/13/2021 1427      5/13/2021             NIBP:  105/63    Pulse:  80    SpO2:  100 %    Resp Rate (observed):  18    EKG:  Sinus rhythm          Electronically Signed By: Percy Oviedo MD  May 13, 2021  3:07 PM

## 2021-05-13 NOTE — OP NOTE
Procedure Date: 05/13/2021    PREOPERATIVE DIAGNOSIS:  Right-sided breast cancer.    POSTOPERATIVE DIAGNOSIS:  Right-sided breast cancer.    PROCEDURE:  Reexcision of right breast lumpectomy margins.    ANESTHESIA:  General plus local.    SURGEON:  Kale Healy M.D.    ASSISTANT:  Cassie Thacker PA-C.  A physician assistant was medically necessary for her skills in suturing, cutting suture exposure and suctioning throughout the operation.    SPECIMENS:  Reexcision, right breast medial margin, suture marks in margin.    COMPLICATIONS:  None.    INDICATIONS FOR PROCEDURE:  Mr. Gurrola is a 45-year-old female under my care for a diagnosis of a right-sided invasive ductal carcinoma who had DCIS on her initial margin and has had reexcised margins with persistent disease.  She was advised to have further excision versus mastectomy and the patient chose to continue to proceed with breast-conserving therapy.  Risks of the operation including infection, bleeding and need for further surgery were reviewed.  The patient verbalized understanding of the above and consented to proceed.    FINDINGS:  We reexcised the medial margin measuring approximately 4-5 mm in thickness without gross findings.    DESCRIPTION OF PROCEDURE:  With the patient under excellent general anesthesia in supine position, the right breast was prepped and draped out with chlorhexidine in the usual fashion.  A timeout was performed confirming the patient, procedure to be done as well as drug allergies and site markings.  She did receive a dose of Ancef for infection prophylaxis prior to beginning our procedure.     We began by using a #15 blade to sharply incise the curvilinear incision at the 9 o'clock and used sharp dissection to enter the seroma cavity just deep to the scar.  This was aspirated with a suction catheter and the site was subsequently irrigated.  We used Leonardo retractors to pull the skin edge medially and the medial aspect of the lumpectomy  site was excised using electrocautery, measuring approximately 0.5 cm in thickness.  There were no gross findings.  This came out in 2 fragments, the smaller of which was the inferior aspect of the medial margin.  Both specimens were marked with a stitch at the new margin and submitted as reexcision medial margin with the stitch marking the medial aspect.     The wound site was then anesthetized with 20 mL of 0.5% plain Marcaine and irrigated with saline.  It was cauterized to good hemostatic effect and a new marking clip was placed at the medial aspect.  Skin was closed with 3-0 and 4-0 Vicryl and skin glue.  The patient tolerated the procedure well, was extubated and brought to recovery in excellent condition.  All sharps and sponge counts were correct at the conclusion of the case.      Kale Cooney MD        D: 2021   T: 2021   MT: PAKMT/DCQA    Name:     MIR PACHECO  MRN:      [id number]        Account:        [id number]   :      1975           Procedure Date: 2021     Document: [id number]    cc:  Ayesha Gomez MD

## 2021-05-14 LAB — COPATH REPORT: NORMAL

## 2021-06-04 ENCOUNTER — OFFICE VISIT (OUTPATIENT)
Dept: SURGERY | Facility: CLINIC | Age: 46
End: 2021-06-04
Payer: COMMERCIAL

## 2021-06-04 VITALS
SYSTOLIC BLOOD PRESSURE: 106 MMHG | WEIGHT: 165 LBS | OXYGEN SATURATION: 100 % | HEART RATE: 78 BPM | BODY MASS INDEX: 25.01 KG/M2 | RESPIRATION RATE: 16 BRPM | HEIGHT: 68 IN | DIASTOLIC BLOOD PRESSURE: 62 MMHG

## 2021-06-04 DIAGNOSIS — Z09 SURGICAL FOLLOWUP VISIT: Primary | ICD-10-CM

## 2021-06-04 PROCEDURE — 99024 POSTOP FOLLOW-UP VISIT: CPT | Performed by: SURGERY

## 2021-06-04 ASSESSMENT — MIFFLIN-ST. JEOR: SCORE: 1436.94

## 2021-06-04 NOTE — PROGRESS NOTES
Re:  Niki Gurrola- 1975    Dear Dr. Rivas,    I had the pleasure of seeing Niki today in follow-up for her right breast lumpectomy margin re-excision on 5/13/21. The patient tolerated the procedure well. We reviewed the pathology again today which showed no residual malignancy thus giving her adequately negative margins from her initial lumpectomy. She has no particular concerns today and has no concerns about her healing progress.    On exam, the patient's incisions are healing well without signs of infection or significant seroma.     Niki is doing very well postoperatively. I stated that she should be ready to start radiation at any time. We will be happy to see her in the future as needed. She was encouraged to call us with any questions or concerns.      Sincerely,         Kale Healy MD      Please route or send letter to:  Primary Care Provider (PCP)  Dr aRdha Blandon (radiation oncology)

## 2021-06-04 NOTE — LETTER
2021    RE: Niki Gurrola, : 1975        I had the pleasure of seeing Niki today in follow-up for her right breast lumpectomy margin re-excision on 21. The patient tolerated the procedure well. We reviewed the pathology again today which showed no residual malignancy thus giving her adequately negative margins from her initial lumpectomy. She has no particular concerns today and has no concerns about her healing progress.     On exam, the patient's incisions are healing well without signs of infection or significant seroma.      Niki is doing very well postoperatively. I stated that she should be ready to start radiation at any time. We will be happy to see her in the future as needed. She was encouraged to call us with any questions or concerns.      Sincerely,            Kale Healy MD          
Name band;

## 2021-06-30 ENCOUNTER — TRANSFERRED RECORDS (OUTPATIENT)
Dept: SURGERY | Facility: CLINIC | Age: 46
End: 2021-06-30

## 2021-10-24 ENCOUNTER — HEALTH MAINTENANCE LETTER (OUTPATIENT)
Age: 46
End: 2021-10-24

## 2021-11-12 ENCOUNTER — TRANSFERRED RECORDS (OUTPATIENT)
Dept: SURGERY | Facility: CLINIC | Age: 46
End: 2021-11-12
Payer: COMMERCIAL

## 2021-12-02 PROCEDURE — 88305 TISSUE EXAM BY PATHOLOGIST: CPT | Mod: TC,ORL | Performed by: OBSTETRICS & GYNECOLOGY

## 2021-12-03 ENCOUNTER — LAB REQUISITION (OUTPATIENT)
Dept: LAB | Facility: CLINIC | Age: 46
End: 2021-12-03
Payer: COMMERCIAL

## 2021-12-06 LAB
PATH REPORT.COMMENTS IMP SPEC: NORMAL
PATH REPORT.COMMENTS IMP SPEC: NORMAL
PATH REPORT.FINAL DX SPEC: NORMAL
PATH REPORT.GROSS SPEC: NORMAL
PATH REPORT.MICROSCOPIC SPEC OTHER STN: NORMAL
PATH REPORT.RELEVANT HX SPEC: NORMAL
PHOTO IMAGE: NORMAL

## 2021-12-06 PROCEDURE — 88305 TISSUE EXAM BY PATHOLOGIST: CPT | Mod: 26 | Performed by: PATHOLOGY

## 2021-12-08 ENCOUNTER — HOSPITAL ENCOUNTER (OUTPATIENT)
Dept: MAMMOGRAPHY | Facility: CLINIC | Age: 46
Discharge: HOME | End: 2021-12-08
Attending: INTERNAL MEDICINE | Admitting: INTERNAL MEDICINE
Payer: COMMERCIAL

## 2021-12-08 ENCOUNTER — IMMUNIZATION (OUTPATIENT)
Dept: NURSING | Facility: CLINIC | Age: 46
End: 2021-12-08
Payer: COMMERCIAL

## 2021-12-08 DIAGNOSIS — Z12.31 VISIT FOR SCREENING MAMMOGRAM: ICD-10-CM

## 2021-12-08 PROCEDURE — 0064A COVID-19,PF,MODERNA (18+ YRS BOOSTER .25ML): CPT

## 2021-12-08 PROCEDURE — 77063 BREAST TOMOSYNTHESIS BI: CPT

## 2021-12-08 PROCEDURE — 91306 COVID-19,PF,MODERNA (18+ YRS BOOSTER .25ML): CPT

## 2021-12-10 ENCOUNTER — HOSPITAL ENCOUNTER (OUTPATIENT)
Dept: BONE DENSITY | Facility: CLINIC | Age: 46
Discharge: HOME | End: 2021-12-10
Attending: INTERNAL MEDICINE | Admitting: INTERNAL MEDICINE
Payer: COMMERCIAL

## 2021-12-10 ENCOUNTER — TRANSFERRED RECORDS (OUTPATIENT)
Dept: SURGERY | Facility: CLINIC | Age: 46
End: 2021-12-10
Payer: COMMERCIAL

## 2021-12-10 DIAGNOSIS — C50.811 MALIGNANT NEOPLASM OF OVERLAPPING SITES OF RIGHT FEMALE BREAST (H): ICD-10-CM

## 2021-12-10 PROCEDURE — 77080 DXA BONE DENSITY AXIAL: CPT

## 2022-02-13 ENCOUNTER — HEALTH MAINTENANCE LETTER (OUTPATIENT)
Age: 47
End: 2022-02-13

## 2022-06-07 ENCOUNTER — HOSPITAL ENCOUNTER (OUTPATIENT)
Dept: MRI IMAGING | Facility: CLINIC | Age: 47
Discharge: HOME | End: 2022-06-07
Attending: INTERNAL MEDICINE | Admitting: INTERNAL MEDICINE
Payer: COMMERCIAL

## 2022-06-07 DIAGNOSIS — C50.811 MALIGNANT NEOPLASM OF OVERLAPPING SITES OF RIGHT BREAST (H): ICD-10-CM

## 2022-06-07 PROCEDURE — 255N000002 HC RX 255 OP 636: Performed by: INTERNAL MEDICINE

## 2022-06-07 PROCEDURE — 77049 MRI BREAST C-+ W/CAD BI: CPT

## 2022-06-07 PROCEDURE — A9585 GADOBUTROL INJECTION: HCPCS | Performed by: INTERNAL MEDICINE

## 2022-06-07 RX ORDER — GADOBUTROL 604.72 MG/ML
7.5 INJECTION INTRAVENOUS ONCE
Status: COMPLETED | OUTPATIENT
Start: 2022-06-07 | End: 2022-06-07

## 2022-06-07 RX ADMIN — GADOBUTROL 7 ML: 604.72 INJECTION INTRAVENOUS at 13:51

## 2022-08-26 ENCOUNTER — TRANSFERRED RECORDS (OUTPATIENT)
Dept: HEALTH INFORMATION MANAGEMENT | Facility: CLINIC | Age: 47
End: 2022-08-26

## 2022-09-23 ENCOUNTER — TRANSFERRED RECORDS (OUTPATIENT)
Dept: SURGERY | Facility: CLINIC | Age: 47
End: 2022-09-23

## 2022-10-16 ENCOUNTER — HEALTH MAINTENANCE LETTER (OUTPATIENT)
Age: 47
End: 2022-10-16

## 2022-12-09 ENCOUNTER — HOSPITAL ENCOUNTER (OUTPATIENT)
Dept: MAMMOGRAPHY | Facility: CLINIC | Age: 47
Discharge: HOME | End: 2022-12-09
Attending: INTERNAL MEDICINE | Admitting: INTERNAL MEDICINE
Payer: COMMERCIAL

## 2022-12-09 DIAGNOSIS — Z12.31 VISIT FOR SCREENING MAMMOGRAM: ICD-10-CM

## 2022-12-09 PROCEDURE — 77067 SCR MAMMO BI INCL CAD: CPT

## 2023-01-30 ENCOUNTER — TRANSFERRED RECORDS (OUTPATIENT)
Dept: SURGERY | Facility: CLINIC | Age: 48
End: 2023-01-30
Payer: COMMERCIAL

## 2023-03-16 ENCOUNTER — TRANSFERRED RECORDS (OUTPATIENT)
Dept: HEALTH INFORMATION MANAGEMENT | Facility: CLINIC | Age: 48
End: 2023-03-16
Payer: COMMERCIAL

## 2023-03-16 ENCOUNTER — TRANSFERRED RECORDS (OUTPATIENT)
Dept: SURGERY | Facility: CLINIC | Age: 48
End: 2023-03-16
Payer: COMMERCIAL

## 2023-03-26 ENCOUNTER — HEALTH MAINTENANCE LETTER (OUTPATIENT)
Age: 48
End: 2023-03-26

## 2023-06-16 ENCOUNTER — HOSPITAL ENCOUNTER (EMERGENCY)
Facility: CLINIC | Age: 48
Discharge: HOME | End: 2023-06-16
Attending: EMERGENCY MEDICINE | Admitting: EMERGENCY MEDICINE
Payer: COMMERCIAL

## 2023-06-16 VITALS
TEMPERATURE: 97.6 F | HEART RATE: 84 BPM | DIASTOLIC BLOOD PRESSURE: 84 MMHG | RESPIRATION RATE: 18 BRPM | SYSTOLIC BLOOD PRESSURE: 116 MMHG | OXYGEN SATURATION: 98 %

## 2023-06-16 DIAGNOSIS — R55 VASOVAGAL SYNCOPE: ICD-10-CM

## 2023-06-16 LAB
ANION GAP SERPL CALCULATED.3IONS-SCNC: 11 MMOL/L (ref 7–15)
BASOPHILS # BLD AUTO: 0.1 10E3/UL (ref 0–0.2)
BASOPHILS NFR BLD AUTO: 1 %
BUN SERPL-MCNC: 14.2 MG/DL (ref 6–20)
CALCIUM SERPL-MCNC: 9.1 MG/DL (ref 8.6–10)
CHLORIDE SERPL-SCNC: 103 MMOL/L (ref 98–107)
CREAT SERPL-MCNC: 0.68 MG/DL (ref 0.51–0.95)
DEPRECATED HCO3 PLAS-SCNC: 26 MMOL/L (ref 22–29)
EOSINOPHIL # BLD AUTO: 0.2 10E3/UL (ref 0–0.7)
EOSINOPHIL NFR BLD AUTO: 2 %
ERYTHROCYTE [DISTWIDTH] IN BLOOD BY AUTOMATED COUNT: 12.4 % (ref 10–15)
GFR SERPL CREATININE-BSD FRML MDRD: >90 ML/MIN/1.73M2
GLUCOSE SERPL-MCNC: 101 MG/DL (ref 70–99)
HCT VFR BLD AUTO: 38.3 % (ref 35–47)
HGB BLD-MCNC: 12.9 G/DL (ref 11.7–15.7)
HOLD SPECIMEN: NORMAL
HOLD SPECIMEN: NORMAL
IMM GRANULOCYTES # BLD: 0 10E3/UL
IMM GRANULOCYTES NFR BLD: 0 %
LYMPHOCYTES # BLD AUTO: 1.9 10E3/UL (ref 0.8–5.3)
LYMPHOCYTES NFR BLD AUTO: 27 %
MCH RBC QN AUTO: 29.8 PG (ref 26.5–33)
MCHC RBC AUTO-ENTMCNC: 33.7 G/DL (ref 31.5–36.5)
MCV RBC AUTO: 89 FL (ref 78–100)
MONOCYTES # BLD AUTO: 0.7 10E3/UL (ref 0–1.3)
MONOCYTES NFR BLD AUTO: 10 %
NEUTROPHILS # BLD AUTO: 4.2 10E3/UL (ref 1.6–8.3)
NEUTROPHILS NFR BLD AUTO: 60 %
NRBC # BLD AUTO: 0 10E3/UL
NRBC BLD AUTO-RTO: 0 /100
PLATELET # BLD AUTO: 281 10E3/UL (ref 150–450)
POTASSIUM SERPL-SCNC: 3.4 MMOL/L (ref 3.4–5.3)
RBC # BLD AUTO: 4.33 10E6/UL (ref 3.8–5.2)
SODIUM SERPL-SCNC: 140 MMOL/L (ref 136–145)
TROPONIN T SERPL HS-MCNC: <6 NG/L
WBC # BLD AUTO: 7.1 10E3/UL (ref 4–11)

## 2023-06-16 PROCEDURE — 96360 HYDRATION IV INFUSION INIT: CPT

## 2023-06-16 PROCEDURE — 258N000003 HC RX IP 258 OP 636: Performed by: EMERGENCY MEDICINE

## 2023-06-16 PROCEDURE — 85025 COMPLETE CBC W/AUTO DIFF WBC: CPT | Performed by: EMERGENCY MEDICINE

## 2023-06-16 PROCEDURE — 80048 BASIC METABOLIC PNL TOTAL CA: CPT | Performed by: EMERGENCY MEDICINE

## 2023-06-16 PROCEDURE — 36415 COLL VENOUS BLD VENIPUNCTURE: CPT | Performed by: EMERGENCY MEDICINE

## 2023-06-16 PROCEDURE — 93005 ELECTROCARDIOGRAM TRACING: CPT

## 2023-06-16 PROCEDURE — 84484 ASSAY OF TROPONIN QUANT: CPT | Performed by: EMERGENCY MEDICINE

## 2023-06-16 PROCEDURE — 99284 EMERGENCY DEPT VISIT MOD MDM: CPT | Mod: 25

## 2023-06-16 RX ADMIN — SODIUM CHLORIDE 1000 ML: 9 INJECTION, SOLUTION INTRAVENOUS at 16:25

## 2023-06-16 ASSESSMENT — ACTIVITIES OF DAILY LIVING (ADL)
ADLS_ACUITY_SCORE: 35
ADLS_ACUITY_SCORE: 35

## 2023-06-16 NOTE — ED TRIAGE NOTES
Was across the street for a MRI to follow up on her breast cancer. Patient passed out when they started the IV. A&O here

## 2023-06-16 NOTE — ED PROVIDER NOTES
History     Chief Complaint:  Syncope       HPI   Niki Gurrola is a 47 year old female with history of breast cancer status post radiation presenting to the ER after syncopal episode.  Patient reports that she was at MRI today for routine breast cancer screening when she had a fainting spell.  Patient reports that shortly after the IV was established for MRI and saline was flushed through, she started to feel lightheaded with tunnel vision.  She denies any room spinning dizziness, severe headache, chest pain, shortness of breath, abdominal pain prior.  Reportedly went unresponsive and was diaphoretic and pale.  This lasted briefly and patient was able to answer simple questions thereafter.  No prior history of seizures, exertional syncope.  Reports mild headache and mild lightheadedness.  Denies any focal numbness/tingling or weakness, chest pain, SOB, abdominal pain.  Denies any recent changes to medications.      Independent Historian:   None - Patient Only    Review of External Notes:   I reviewed the RN note from earlier today after patient had syncopal episode.      Medications:    levonorgestrel (MIRENA, 52 MG,) 20 MCG/24HR IUD        Past Medical History:    Past Medical History:   Diagnosis Date     Cancer (H)      Thyroid disease        Past Surgical History:    Past Surgical History:   Procedure Laterality Date     C/SECTION, LOW TRANSVERSE      , Low Transverse     LUMPECTOMY BREAST Right 2021    Procedure: REEXCISION RIGHT  BREAST LUMPECTOMY MARGIN;  Surgeon: Kale Quarles MD;  Location: [facility] OR     LUMPECTOMY BREAST Right 2021    Procedure: REEXCISION RIGHT  BREAST LUMPECTOMY MARGIN;  Surgeon: Kale Qurales MD;  Location: [facility] OR     LUMPECTOMY BREAST, SEED LOCALIZATION, SENTINEL NODE Right 2021    Procedure: RIGHT BREAST SEED LOCALIZED LUMPECTOMY, RIGHT AXILLARY SENTINEL NODE BIOPSY;  Surgeon: Kale Quarles MD;  Location: [facility] OR     ORTHOPEDIC  SURGERY       SURGICAL HISTORY OF -       ACL Repair     SURGICAL HISTORY OF -       oral surgery, benign lesion     ZZC  DELIVERY ONLY      , Low Cervical        Physical Exam     Patient Vitals for the past 24 hrs:   BP Temp Temp src Pulse Resp SpO2   23 1728 116/84 -- -- 84 18 98 %   23 1624 113/80 -- -- -- -- 99 %   23 1600 -- -- -- -- -- 99 %   23 1500 99/71 -- -- -- -- 94 %   23 1459 99/71 97.6  F (36.4  C) Temporal -- -- 94 %   23 1457 99/71 -- -- 62 20 96 %        Physical Exam  General: Alert, no acute distress; well appearing  Neuro:  PERRL.  EOMI.  Gait stable, no focal deficits. GCS 15.  HEENT:  Moist mucous membranes.  Conjunctiva normal.   CV:  RRR, no m/r/g, skin warm and well perfused; no carotid bruits bilaterally.  Pulm:  CTAB, no wheezes/ronchi/rales.  No acute distress, breathing comfortably  GI:  Soft, nontender, nondistended.  No rebound or guarding.    MSK:  Moving all extremities.  No focal areas of edema, erythema  Skin:  WWP, no rashes,  skin color normal, no diaphoresis    Emergency Department Course   ECG  ECG taken at 1501, ECG read at 1507  Sinus bradycardia  Rate 57 bpm. IA interval 136 ms. QRS duration 90 ms. QT/QTc 462/449 ms. P-R-T axes 51 -2 53.     Imaging:  No orders to display       Laboratory:  Labs Ordered and Resulted from Time of ED Arrival to Time of ED Departure   BASIC METABOLIC PANEL - Abnormal       Result Value    Sodium 140      Potassium 3.4      Chloride 103      Carbon Dioxide (CO2) 26      Anion Gap 11      Urea Nitrogen 14.2      Creatinine 0.68      Calcium 9.1      Glucose 101 (*)     GFR Estimate >90     TROPONIN T, HIGH SENSITIVITY - Normal    Troponin T, High Sensitivity <6     CBC WITH PLATELETS AND DIFFERENTIAL    WBC Count 7.1      RBC Count 4.33      Hemoglobin 12.9      Hematocrit 38.3      MCV 89      MCH 29.8      MCHC 33.7      RDW 12.4      Platelet Count 281      % Neutrophils 60       % Lymphocytes 27      % Monocytes 10      % Eosinophils 2      % Basophils 1      % Immature Granulocytes 0      NRBCs per 100 WBC 0      Absolute Neutrophils 4.2      Absolute Lymphocytes 1.9      Absolute Monocytes 0.7      Absolute Eosinophils 0.2      Absolute Basophils 0.1      Absolute Immature Granulocytes 0.0      Absolute NRBCs 0.0          Procedures   None    Emergency Department Course & Assessments:             Interventions:  Medications   0.9% sodium chloride BOLUS (0 mLs Intravenous Stopped 23 1723)          Independent Interpretation (X-rays, CTs, rhythm strip):  None    Consultations/Discussion of Management or Tests:  None        Social Determinants of Health affecting care:   None    Disposition:  The patient was discharged to home.     Impression & Plan      Medical Decision Makin-year-old female with history of breast cancer status post radiation presenting to the ER after syncopal episode.  Please see above for details of HPI and exam.  Overall suspect vasovagal syncope as symptoms occurred shortly after IV placement.  Also consider broad differential including severe electrolyte/metabolic derangement, ACS, cardiac dysrhythmia, orthostasis amongst other things.  She is otherwise neurologically intact without any focal deficits.  She denies any severe headache.  Doubt seizure, stroke/TIA or intracranial catastrophe requiring advanced head imaging.  Also doubt acute cardiopulmonary pathology such as PE given no chest pain or SOB.  Screening EKG shows no acute ischemic appearing changes or signs of dysrhythmia.  No signs of WPW, Brugada, prolonged QT or signs of HOCM.  No red flag history of exertional syncope.  Her basic lab studies above including troponin are normal.  Orthostatics are normal.  Overall she feels well and I feel that she is safe to discharge home.  She is comfortable with outpatient follow-up for your visit.  Discussed return precautions to the ER.  All questions  answered prior to discharge.         Diagnosis:    ICD-10-CM    1. Vasovagal syncope  R55            Discharge Medications:  Discharge Medication List as of 6/16/2023  5:23 PM             Rudy Vaughn MD  6/16/2023        Rudy Vaughn MD  06/16/23 [id number]

## 2023-06-19 LAB
ATRIAL RATE - MUSE: 57 BPM
DIASTOLIC BLOOD PRESSURE - MUSE: NORMAL MMHG
INTERPRETATION ECG - MUSE: NORMAL
P AXIS - MUSE: 51 DEGREES
PR INTERVAL - MUSE: 136 MS
QRS DURATION - MUSE: 90 MS
QT - MUSE: 462 MS
QTC - MUSE: 449 MS
R AXIS - MUSE: -2 DEGREES
SYSTOLIC BLOOD PRESSURE - MUSE: NORMAL MMHG
T AXIS - MUSE: 53 DEGREES
VENTRICULAR RATE- MUSE: 57 BPM

## 2023-07-17 ENCOUNTER — HOSPITAL ENCOUNTER (OUTPATIENT)
Dept: MRI IMAGING | Facility: CLINIC | Age: 48
Discharge: HOME | End: 2023-07-17
Attending: INTERNAL MEDICINE
Payer: COMMERCIAL

## 2023-07-17 DIAGNOSIS — C50.811 MALIGNANT NEOPLASM OF OVERLAPPING SITES OF RIGHT FEMALE BREAST (H): ICD-10-CM

## 2023-07-17 PROCEDURE — 77049 MRI BREAST C-+ W/CAD BI: CPT | Mod: 76

## 2023-07-17 PROCEDURE — 255N000002 HC RX 255 OP 636: Performed by: INTERNAL MEDICINE

## 2023-07-17 PROCEDURE — A9585 GADOBUTROL INJECTION: HCPCS | Performed by: INTERNAL MEDICINE

## 2023-07-17 PROCEDURE — 77049 MRI BREAST C-+ W/CAD BI: CPT

## 2023-07-17 RX ORDER — GADOBUTROL 604.72 MG/ML
8 INJECTION INTRAVENOUS ONCE
Status: COMPLETED | OUTPATIENT
Start: 2023-07-17 | End: 2023-07-17

## 2023-07-17 RX ORDER — GADOBUTROL 604.72 MG/ML
8 INJECTION INTRAVENOUS ONCE
Status: DISCONTINUED | OUTPATIENT
Start: 2023-07-17 | End: 2023-07-17

## 2023-07-17 RX ADMIN — GADOBUTROL 8 ML: 604.72 INJECTION INTRAVENOUS at 10:15

## 2023-11-13 ENCOUNTER — TRANSFERRED RECORDS (OUTPATIENT)
Dept: HEALTH INFORMATION MANAGEMENT | Facility: CLINIC | Age: 48
End: 2023-11-13
Payer: COMMERCIAL

## 2023-12-11 ENCOUNTER — HOSPITAL ENCOUNTER (OUTPATIENT)
Dept: MAMMOGRAPHY | Facility: CLINIC | Age: 48
Discharge: HOME | End: 2023-12-11
Attending: INTERNAL MEDICINE | Admitting: INTERNAL MEDICINE
Payer: COMMERCIAL

## 2023-12-11 DIAGNOSIS — Z12.31 VISIT FOR SCREENING MAMMOGRAM: ICD-10-CM

## 2023-12-11 DIAGNOSIS — C50.811 MALIGNANT NEOPLASM OF OVERLAPPING SITES OF RIGHT FEMALE BREAST (H): ICD-10-CM

## 2023-12-11 PROCEDURE — 77067 SCR MAMMO BI INCL CAD: CPT

## 2023-12-15 ENCOUNTER — TRANSFERRED RECORDS (OUTPATIENT)
Dept: SURGERY | Facility: CLINIC | Age: 48
End: 2023-12-15
Payer: COMMERCIAL

## 2023-12-20 ENCOUNTER — MEDICAL CORRESPONDENCE (OUTPATIENT)
Dept: SCHEDULING | Facility: CLINIC | Age: 48
End: 2023-12-20
Payer: COMMERCIAL

## 2024-03-29 ENCOUNTER — HOSPITAL ENCOUNTER (OUTPATIENT)
Dept: BONE DENSITY | Facility: CLINIC | Age: 49
Discharge: HOME | End: 2024-03-29
Attending: NURSE PRACTITIONER | Admitting: NURSE PRACTITIONER
Payer: COMMERCIAL

## 2024-03-29 DIAGNOSIS — C50.811 MALIGNANT NEOPLASM OF OVERLAPPING SITES OF RIGHT FEMALE BREAST (H): ICD-10-CM

## 2024-03-29 DIAGNOSIS — Z79.811 LONG TERM CURRENT USE OF AROMATASE INHIBITOR: ICD-10-CM

## 2024-03-29 PROCEDURE — 77080 DXA BONE DENSITY AXIAL: CPT

## 2024-05-06 NOTE — PATIENT INSTRUCTIONS
LABS AND X- RAY   WALK IN ANYTIME-   ____________________________________________    PLASTIC SURGERY    DR MOREIRA   2/24/2021 AT 11:00 AM      [address]  [place]  [address]  [phone]  _____________________________________________    ONCOLOGY     DR PLASCENCIA     2/23/2021 AT 2:10 PM   [address],  [place] [address] [phone]    
Detail Level: Detailed
Patient Specific Counseling (Will Not Stick From Patient To Patient): Will consider Dupixent after trying generic Elidel.

## 2024-06-01 ENCOUNTER — HEALTH MAINTENANCE LETTER (OUTPATIENT)
Age: 49
End: 2024-06-01

## 2024-07-11 ENCOUNTER — HOSPITAL ENCOUNTER (OUTPATIENT)
Dept: MRI IMAGING | Facility: CLINIC | Age: 49
Discharge: HOME | End: 2024-07-11
Attending: NURSE PRACTITIONER | Admitting: NURSE PRACTITIONER
Payer: COMMERCIAL

## 2024-07-11 DIAGNOSIS — C50.811 MALIGNANT NEOPLASM OF OVERLAPPING SITES OF RIGHT FEMALE BREAST (H): ICD-10-CM

## 2024-07-11 DIAGNOSIS — R92.30 DENSE BREASTS: ICD-10-CM

## 2024-07-11 PROCEDURE — 255N000002 HC RX 255 OP 636: Performed by: NURSE PRACTITIONER

## 2024-07-11 PROCEDURE — 77049 MRI BREAST C-+ W/CAD BI: CPT

## 2024-07-11 PROCEDURE — A9585 GADOBUTROL INJECTION: HCPCS | Performed by: NURSE PRACTITIONER

## 2024-07-11 RX ORDER — GADOBUTROL 604.72 MG/ML
7.5 INJECTION INTRAVENOUS ONCE
Status: COMPLETED | OUTPATIENT
Start: 2024-07-11 | End: 2024-07-11

## 2024-07-11 RX ADMIN — GADOBUTROL 7.5 ML: 604.72 INJECTION INTRAVENOUS at 14:45

## 2024-08-15 ENCOUNTER — HOSPITAL ENCOUNTER (EMERGENCY)
Facility: CLINIC | Age: 49
Discharge: HOME | End: 2024-08-16
Attending: EMERGENCY MEDICINE | Admitting: EMERGENCY MEDICINE
Payer: COMMERCIAL

## 2024-08-15 ENCOUNTER — APPOINTMENT (OUTPATIENT)
Dept: GENERAL RADIOLOGY | Facility: CLINIC | Age: 49
End: 2024-08-15
Attending: EMERGENCY MEDICINE
Payer: COMMERCIAL

## 2024-08-15 DIAGNOSIS — S30.0XXA CONTUSION OF PELVIS, INITIAL ENCOUNTER: ICD-10-CM

## 2024-08-15 PROCEDURE — 99284 EMERGENCY DEPT VISIT MOD MDM: CPT | Performed by: EMERGENCY MEDICINE

## 2024-08-15 PROCEDURE — 93010 ELECTROCARDIOGRAM REPORT: CPT | Performed by: EMERGENCY MEDICINE

## 2024-08-15 PROCEDURE — 72170 X-RAY EXAM OF PELVIS: CPT

## 2024-08-15 PROCEDURE — 93005 ELECTROCARDIOGRAM TRACING: CPT | Performed by: EMERGENCY MEDICINE

## 2024-08-15 PROCEDURE — 99285 EMERGENCY DEPT VISIT HI MDM: CPT | Mod: 25 | Performed by: EMERGENCY MEDICINE

## 2024-08-15 RX ORDER — LEVOTHYROXINE SODIUM 25 UG/1
25 TABLET ORAL DAILY
COMMUNITY

## 2024-08-15 RX ORDER — ANASTROZOLE 1 MG/1
1 TABLET ORAL
COMMUNITY
Start: 2022-09-01

## 2024-08-15 RX ORDER — KETOROLAC TROMETHAMINE 15 MG/ML
15 INJECTION, SOLUTION INTRAMUSCULAR; INTRAVENOUS ONCE
Status: COMPLETED | OUTPATIENT
Start: 2024-08-15 | End: 2024-08-16

## 2024-08-15 RX ORDER — HYDROCODONE BITARTRATE AND ACETAMINOPHEN 5; 325 MG/1; MG/1
1 TABLET ORAL ONCE
Status: COMPLETED | OUTPATIENT
Start: 2024-08-15 | End: 2024-08-16

## 2024-08-15 ASSESSMENT — ACTIVITIES OF DAILY LIVING (ADL): ADLS_ACUITY_SCORE: 35

## 2024-08-15 ASSESSMENT — COLUMBIA-SUICIDE SEVERITY RATING SCALE - C-SSRS
6. HAVE YOU EVER DONE ANYTHING, STARTED TO DO ANYTHING, OR PREPARED TO DO ANYTHING TO END YOUR LIFE?: NO
2. HAVE YOU ACTUALLY HAD ANY THOUGHTS OF KILLING YOURSELF IN THE PAST MONTH?: NO
1. IN THE PAST MONTH, HAVE YOU WISHED YOU WERE DEAD OR WISHED YOU COULD GO TO SLEEP AND NOT WAKE UP?: NO

## 2024-08-16 VITALS
HEART RATE: 98 BPM | OXYGEN SATURATION: 97 % | RESPIRATION RATE: 16 BRPM | TEMPERATURE: 97.9 F | DIASTOLIC BLOOD PRESSURE: 58 MMHG | SYSTOLIC BLOOD PRESSURE: 97 MMHG

## 2024-08-16 LAB
ANION GAP SERPL CALCULATED.3IONS-SCNC: 11 MMOL/L (ref 7–15)
ATRIAL RATE - MUSE: 66 BPM
BASOPHILS # BLD AUTO: 0.1 10E3/UL (ref 0–0.2)
BASOPHILS NFR BLD AUTO: 1 %
BUN SERPL-MCNC: 16.4 MG/DL (ref 6–20)
CALCIUM SERPL-MCNC: 8.8 MG/DL (ref 8.8–10.4)
CHLORIDE SERPL-SCNC: 101 MMOL/L (ref 98–107)
CREAT SERPL-MCNC: 0.72 MG/DL (ref 0.51–0.95)
DIASTOLIC BLOOD PRESSURE - MUSE: NORMAL MMHG
EGFRCR SERPLBLD CKD-EPI 2021: >90 ML/MIN/1.73M2
EOSINOPHIL # BLD AUTO: 0.2 10E3/UL (ref 0–0.7)
EOSINOPHIL NFR BLD AUTO: 2 %
ERYTHROCYTE [DISTWIDTH] IN BLOOD BY AUTOMATED COUNT: 12.8 % (ref 10–15)
GLUCOSE SERPL-MCNC: 110 MG/DL (ref 70–99)
HCG INTACT+B SERPL-ACNC: <1 MIU/ML
HCO3 SERPL-SCNC: 24 MMOL/L (ref 22–29)
HCT VFR BLD AUTO: 36.6 % (ref 35–47)
HGB BLD-MCNC: 12.6 G/DL (ref 11.7–15.7)
IMM GRANULOCYTES # BLD: 0 10E3/UL
IMM GRANULOCYTES NFR BLD: 1 %
INTERPRETATION ECG - MUSE: NORMAL
LYMPHOCYTES # BLD AUTO: 1.7 10E3/UL (ref 0.8–5.3)
LYMPHOCYTES NFR BLD AUTO: 23 %
MCH RBC QN AUTO: 30 PG (ref 26.5–33)
MCHC RBC AUTO-ENTMCNC: 34.4 G/DL (ref 31.5–36.5)
MCV RBC AUTO: 87 FL (ref 78–100)
MONOCYTES # BLD AUTO: 0.7 10E3/UL (ref 0–1.3)
MONOCYTES NFR BLD AUTO: 9 %
NEUTROPHILS # BLD AUTO: 5 10E3/UL (ref 1.6–8.3)
NEUTROPHILS NFR BLD AUTO: 64 %
NRBC # BLD AUTO: 0 10E3/UL
NRBC BLD AUTO-RTO: 0 /100
P AXIS - MUSE: 75 DEGREES
PLATELET # BLD AUTO: 274 10E3/UL (ref 150–450)
POTASSIUM SERPL-SCNC: 3.8 MMOL/L (ref 3.4–5.3)
PR INTERVAL - MUSE: 180 MS
QRS DURATION - MUSE: 84 MS
QT - MUSE: 414 MS
QTC - MUSE: 434 MS
R AXIS - MUSE: 0 DEGREES
RBC # BLD AUTO: 4.2 10E6/UL (ref 3.8–5.2)
SODIUM SERPL-SCNC: 136 MMOL/L (ref 135–145)
SYSTOLIC BLOOD PRESSURE - MUSE: NORMAL MMHG
T AXIS - MUSE: 57 DEGREES
VENTRICULAR RATE- MUSE: 66 BPM
WBC # BLD AUTO: 7.6 10E3/UL (ref 4–11)

## 2024-08-16 PROCEDURE — 80048 BASIC METABOLIC PNL TOTAL CA: CPT | Performed by: EMERGENCY MEDICINE

## 2024-08-16 PROCEDURE — 258N000003 HC RX IP 258 OP 636: Performed by: EMERGENCY MEDICINE

## 2024-08-16 PROCEDURE — 96361 HYDRATE IV INFUSION ADD-ON: CPT | Performed by: EMERGENCY MEDICINE

## 2024-08-16 PROCEDURE — 36415 COLL VENOUS BLD VENIPUNCTURE: CPT | Performed by: EMERGENCY MEDICINE

## 2024-08-16 PROCEDURE — 84702 CHORIONIC GONADOTROPIN TEST: CPT | Performed by: EMERGENCY MEDICINE

## 2024-08-16 PROCEDURE — 250N000013 HC RX MED GY IP 250 OP 250 PS 637: Performed by: EMERGENCY MEDICINE

## 2024-08-16 PROCEDURE — 85041 AUTOMATED RBC COUNT: CPT | Performed by: EMERGENCY MEDICINE

## 2024-08-16 PROCEDURE — 250N000011 HC RX IP 250 OP 636: Performed by: EMERGENCY MEDICINE

## 2024-08-16 PROCEDURE — 96374 THER/PROPH/DIAG INJ IV PUSH: CPT | Performed by: EMERGENCY MEDICINE

## 2024-08-16 RX ORDER — HYDROCODONE BITARTRATE AND ACETAMINOPHEN 5; 325 MG/1; MG/1
1 TABLET ORAL EVERY 6 HOURS PRN
Qty: 10 TABLET | Refills: 0 | Status: SHIPPED | OUTPATIENT
Start: 2024-08-16 | End: 2024-08-19

## 2024-08-16 RX ADMIN — SODIUM CHLORIDE 1000 ML: 9 INJECTION, SOLUTION INTRAVENOUS at 00:23

## 2024-08-16 RX ADMIN — KETOROLAC TROMETHAMINE 15 MG: 15 INJECTION, SOLUTION INTRAMUSCULAR; INTRAVENOUS at 00:24

## 2024-08-16 RX ADMIN — HYDROCODONE BITARTRATE AND ACETAMINOPHEN 1 TABLET: 5; 325 TABLET ORAL at 00:16

## 2024-08-16 ASSESSMENT — ACTIVITIES OF DAILY LIVING (ADL): ADLS_ACUITY_SCORE: 35

## 2024-08-16 NOTE — DISCHARGE INSTRUCTIONS
Take ibuprofen 600 mg every 6 hours with food as needed for pain.  Take Norco as needed for pain not controlled by ibuprofen.  Do not drive for 8 hours after taking Norco.  Apply ice to painful areas for 20 minutes at a time, 3-4 times per day.  Place a towel between the ice bag and your skin.    Follow-up with your primary care clinic in 1 week if any ongoing symptoms or not improving.    Return to the emergency department if fever, weakness, difficulty with bowel or bladder control, or other concerns

## 2024-08-16 NOTE — ED NOTES
Bed: ED08  Expected date:   Expected time:   Means of arrival:   Comments:  TO TRIAGE: [facility] [address] ETA 10 mins  48F lower back pain after a fall

## 2024-08-16 NOTE — ED TRIAGE NOTES
Triage Assessment (Adult)       Row Name 08/15/24 3421          Triage Assessment    Airway WDL WDL        Respiratory WDL    Respiratory WDL WDL        Skin Circulation/Temperature WDL    Skin Circulation/Temperature WDL WDL        Cardiac WDL    Cardiac WDL WDL        Peripheral/Neurovascular WDL    Peripheral Neurovascular WDL WDL        Cognitive/Neuro/Behavioral WDL    Cognitive/Neuro/Behavioral WDL WDL

## 2024-08-16 NOTE — ED PROVIDER NOTES
[facility] [place] [facility] ([facility])    8/15/24      ED PROVIDER NOTE       History     Chief Complaint   Patient presents with    Back Pain     Pt at [employer] soccer game and slipped and fell on bleachers. Attempted to get up x2 and started to black out both times. Pain 7/10. Denies numbness and tingling. No previous injury to that area       HPI  Niki Gurrola is a 48 year old female with a history of malignant neoplasm of right breast, invasive ductal carcinoma right breast and papillary thyroid cancer who presents to the emergency department for pelvic pain after a fall.  The patient and her  were at the [employer] for soccer game this evening.  She slipped on the bleachers and fell onto her buttocks.  She tried to get up twice and became lightheaded.  She did not faint.  She denies any chest pain or dyspnea.  No abdominal pain.  No bowel or bladder dysfunction.  No leg weakness or numbness.  Patient denies anticoagulant use.    Past Medical History  Past Medical History:   Diagnosis Date    Cancer (H)     Thyroid disease      Past Surgical History:   Procedure Laterality Date    C/SECTION, LOW TRANSVERSE      , Low Transverse    LUMPECTOMY BREAST Right 2021    Procedure: REEXCISION RIGHT  BREAST LUMPECTOMY MARGIN;  Surgeon: Kale Quarles MD;  Location: [facility] OR    LUMPECTOMY BREAST Right 2021    Procedure: REEXCISION RIGHT  BREAST LUMPECTOMY MARGIN;  Surgeon: Kale Quarles MD;  Location: [facility] OR    LUMPECTOMY BREAST, SEED LOCALIZATION, SENTINEL NODE Right 2021    Procedure: RIGHT BREAST SEED LOCALIZED LUMPECTOMY, RIGHT AXILLARY SENTINEL NODE BIOPSY;  Surgeon: Kale Quarles MD;  Location: [facility] OR    ORTHOPEDIC SURGERY      SURGICAL HISTORY OF -       ACL Repair    SURGICAL HISTORY OF -       oral surgery, benign lesion    Z  DELIVERY ONLY      , Low Cervical     anastrozole (ARIMIDEX) 1 MG  tablet  HYDROcodone-acetaminophen (NORCO) 5-325 MG tablet  levothyroxine (SYNTHROID/LEVOTHROID) 25 MCG tablet  levonorgestrel (MIRENA, 52 MG,) 20 MCG/24HR IUD      Allergies   Allergen Reactions    No Known Drug Allergy      Family History  Family History   Problem Relation Age of Onset    Lipids Maternal Grandmother     Hypertension Maternal Grandmother     Lipids Paternal Grandfather     Lipids Father     Cancer Paternal Grandmother         liver and colon    Lipids Paternal Grandmother         ??    Diabetes Paternal Grandmother         adult onset     Social History   Social History     Tobacco Use    Smoking status: Never    Smokeless tobacco: Never   Substance Use Topics    Alcohol use: Yes     Comment: 2 drinks per week    Drug use: No      A medically appropriate review of systems was performed with pertinent positives and negatives noted in the HPI, and all other systems negative.    Physical Exam   BP: 109/69  Pulse: 69  Temp: 98.1  F (36.7  C)  Resp: 16  SpO2: 100 %  Physical Exam  Vitals and nursing note reviewed.   Constitutional:       General: She is not in acute distress.     Appearance: Normal appearance. She is not diaphoretic.   HENT:      Head: Normocephalic and atraumatic.   Eyes:      Extraocular Movements: Extraocular movements intact.      Conjunctiva/sclera: Conjunctivae normal.   Cardiovascular:      Rate and Rhythm: Normal rate.      Heart sounds: Normal heart sounds.   Pulmonary:      Effort: Pulmonary effort is normal. No respiratory distress.      Breath sounds: Normal breath sounds.   Abdominal:      Palpations: Abdomen is soft.      Tenderness: There is no abdominal tenderness.   Musculoskeletal:         General: No tenderness. Normal range of motion.      Cervical back: Normal range of motion and neck supple.   Skin:     General: Skin is warm.      Findings: No rash.   Neurological:      General: No focal deficit present.      Mental Status: She is alert.      Sensory: No sensory  deficit.      Motor: No weakness.      Coordination: Coordination normal.      Deep Tendon Reflexes: Reflexes normal.   Psychiatric:         Mood and Affect: Mood normal.           ED Course, Procedures, & Data      Procedures            EKG Interpretation:      Interpreted by DENY ARANA MD, MD  Time reviewed: 0041  Symptoms at time of EKG: pre-syncope   Rhythm: normal sinus   Rate: 66  Axis: Normal  Ectopy: none  Conduction: normal  ST Segments/ T Waves: Poor R wave progression  Q Waves: none  Comparison to prior: Unchanged from 6/16/24    Clinical Impression: no acute changes    Results for orders placed or performed during the hospital encounter of 08/15/24   XR Pelvis 1/2 Views     Status: None    Narrative    EXAM: XR PELVIS 1/2 VIEWS  LOCATION: [facility]  DATE: 8/15/2024    INDICATION: pain, trauma  COMPARISON: None.      Impression    IMPRESSION: Normal joint spaces and alignment. No fracture.   HCG quantitative pregnancy     Status: Normal   Result Value Ref Range    hCG Quantitative <1 <5 mIU/mL   Basic metabolic panel     Status: Abnormal   Result Value Ref Range    Sodium 136 135 - 145 mmol/L    Potassium 3.8 3.4 - 5.3 mmol/L    Chloride 101 98 - 107 mmol/L    Carbon Dioxide (CO2) 24 22 - 29 mmol/L    Anion Gap 11 7 - 15 mmol/L    Urea Nitrogen 16.4 6.0 - 20.0 mg/dL    Creatinine 0.72 0.51 - 0.95 mg/dL    GFR Estimate >90 >60 mL/min/1.73m2    Calcium 8.8 8.8 - 10.4 mg/dL    Glucose 110 (H) 70 - 99 mg/dL   CBC with platelets and differential     Status: None   Result Value Ref Range    WBC Count 7.6 4.0 - 11.0 10e3/uL    RBC Count 4.20 3.80 - 5.20 10e6/uL    Hemoglobin 12.6 11.7 - 15.7 g/dL    Hematocrit 36.6 35.0 - 47.0 %    MCV 87 78 - 100 fL    MCH 30.0 26.5 - 33.0 pg    MCHC 34.4 31.5 - 36.5 g/dL    RDW 12.8 10.0 - 15.0 %    Platelet Count 274 150 - 450 10e3/uL    % Neutrophils 64 %    % Lymphocytes 23 %    % Monocytes 9 %    % Eosinophils 2 %    %  Basophils 1 %    % Immature Granulocytes 1 %    NRBCs per 100 WBC 0 <1 /100    Absolute Neutrophils 5.0 1.6 - 8.3 10e3/uL    Absolute Lymphocytes 1.7 0.8 - 5.3 10e3/uL    Absolute Monocytes 0.7 0.0 - 1.3 10e3/uL    Absolute Eosinophils 0.2 0.0 - 0.7 10e3/uL    Absolute Basophils 0.1 0.0 - 0.2 10e3/uL    Absolute Immature Granulocytes 0.0 <=0.4 10e3/uL    Absolute NRBCs 0.0 10e3/uL   CBC with platelets differential     Status: None    Narrative    The following orders were created for panel order CBC with platelets differential.  Procedure                               Abnormality         Status                     ---------                               -----------         ------                     CBC with platelets and d...[[id number]]                      Final result                 Please view results for these tests on the individual orders.                 Results for orders placed or performed during the hospital encounter of 08/15/24   XR Pelvis 1/2 Views     Status: None    Narrative    EXAM: XR PELVIS 1/2 VIEWS  LOCATION: [facility]  DATE: 8/15/2024    INDICATION: pain, trauma  COMPARISON: None.      Impression    IMPRESSION: Normal joint spaces and alignment. No fracture.   HCG quantitative pregnancy     Status: Normal   Result Value Ref Range    hCG Quantitative <1 <5 mIU/mL   Basic metabolic panel     Status: Abnormal   Result Value Ref Range    Sodium 136 135 - 145 mmol/L    Potassium 3.8 3.4 - 5.3 mmol/L    Chloride 101 98 - 107 mmol/L    Carbon Dioxide (CO2) 24 22 - 29 mmol/L    Anion Gap 11 7 - 15 mmol/L    Urea Nitrogen 16.4 6.0 - 20.0 mg/dL    Creatinine 0.72 0.51 - 0.95 mg/dL    GFR Estimate >90 >60 mL/min/1.73m2    Calcium 8.8 8.8 - 10.4 mg/dL    Glucose 110 (H) 70 - 99 mg/dL   CBC with platelets and differential     Status: None   Result Value Ref Range    WBC Count 7.6 4.0 - 11.0 10e3/uL    RBC Count 4.20 3.80 - 5.20 10e6/uL    Hemoglobin 12.6  11.7 - 15.7 g/dL    Hematocrit 36.6 35.0 - 47.0 %    MCV 87 78 - 100 fL    MCH 30.0 26.5 - 33.0 pg    MCHC 34.4 31.5 - 36.5 g/dL    RDW 12.8 10.0 - 15.0 %    Platelet Count 274 150 - 450 10e3/uL    % Neutrophils 64 %    % Lymphocytes 23 %    % Monocytes 9 %    % Eosinophils 2 %    % Basophils 1 %    % Immature Granulocytes 1 %    NRBCs per 100 WBC 0 <1 /100    Absolute Neutrophils 5.0 1.6 - 8.3 10e3/uL    Absolute Lymphocytes 1.7 0.8 - 5.3 10e3/uL    Absolute Monocytes 0.7 0.0 - 1.3 10e3/uL    Absolute Eosinophils 0.2 0.0 - 0.7 10e3/uL    Absolute Basophils 0.1 0.0 - 0.2 10e3/uL    Absolute Immature Granulocytes 0.0 <=0.4 10e3/uL    Absolute NRBCs 0.0 10e3/uL   CBC with platelets differential     Status: None    Narrative    The following orders were created for panel order CBC with platelets differential.  Procedure                               Abnormality         Status                     ---------                               -----------         ------                     CBC with platelets and d...[[id number]]                      Final result                 Please view results for these tests on the individual orders.     Medications   sodium chloride 0.9% BOLUS 1,000 mL (1,000 mLs Intravenous $New Bag 8/16/24 0023)   ketorolac (TORADOL) injection 15 mg (15 mg Intravenous $Given 8/16/24 0024)   HYDROcodone-acetaminophen (NORCO) 5-325 MG per tablet 1 tablet (1 tablet Oral $Given 8/16/24 0016)     Labs Ordered and Resulted from Time of ED Arrival to Time of ED Departure   BASIC METABOLIC PANEL - Abnormal       Result Value    Sodium 136      Potassium 3.8      Chloride 101      Carbon Dioxide (CO2) 24      Anion Gap 11      Urea Nitrogen 16.4      Creatinine 0.72      GFR Estimate >90      Calcium 8.8      Glucose 110 (*)    HCG QUANTITATIVE PREGNANCY - Normal    hCG Quantitative <1     CBC WITH PLATELETS AND DIFFERENTIAL    WBC Count 7.6      RBC Count 4.20      Hemoglobin 12.6      Hematocrit 36.6      MCV  87      MCH 30.0      MCHC 34.4      RDW 12.8      Platelet Count 274      % Neutrophils 64      % Lymphocytes 23      % Monocytes 9      % Eosinophils 2      % Basophils 1      % Immature Granulocytes 1      NRBCs per 100 WBC 0      Absolute Neutrophils 5.0      Absolute Lymphocytes 1.7      Absolute Monocytes 0.7      Absolute Eosinophils 0.2      Absolute Basophils 0.1      Absolute Immature Granulocytes 0.0      Absolute NRBCs 0.0       XR Pelvis 1/2 Views   Final Result   IMPRESSION: Normal joint spaces and alignment. No fracture.         Reviewed [facility] emergency department encounter on 6/16/2023 for vasovagal syncope.  Reviewed previous CBC, metabolic panel, EKG.    Critical care was not performed.     Medical Decision Making  The patient's presentation was of moderate complexity (an acute complicated injury).    The patient's evaluation involved:  review of external note(s) from 1 sources (see separate area of note for details)  review of 3+ test result(s) ordered prior to this encounter (see separate area of note for details)  ordering and/or review of 3+ test(s) in this encounter (see separate area of note for details)    The patient's management necessitated moderate risk (prescription drug management including medications given in the ED).    Assessment & Plan    48 year old female with pelvic pain status post slip and fall and presyncope x 2 with attempts to get up in the context of severe pelvic pain.  She arrives via EMS.  She has normal vital signs and appears clinically well.  She has posterior pelvis tenderness to palpation.  Differential includes fracture and contusion.  Radiograph reveals no evidence for fracture.  Suspect patient's lightheadedness/presyncope was vasovagal.  She has had vasovagal syncope in the past.  Her EKG is unchanged.  She has no electrolyte abnormality or significant anemia.  She was given Toradol and Norco here in the emergency department with good control of her  symptoms.  She was subsequently able to ambulate in the emergency department.  She will be discharged home with ibuprofen for pain.  Norco for breakthrough pain.  Ice recommended.    I have reviewed the nursing notes. I have reviewed the findings, diagnosis, plan and need for follow up with the patient.    New Prescriptions    HYDROCODONE-ACETAMINOPHEN (NORCO) 5-325 MG TABLET    Take 1 tablet by mouth every 6 hours as needed for severe pain       Final diagnoses:   Contusion of pelvis, initial encounter     Chart documentation was completed with Dragon voice-recognition software. Even though reviewed, this chart may still contain some grammatical, spelling, and word errors.     [facility] EMERGENCY DEPARTMENT  8/15/2024     Carroll Zamudio MD  08/16/24 0201

## 2024-11-26 ENCOUNTER — TRANSFERRED RECORDS (OUTPATIENT)
Dept: SURGERY | Facility: CLINIC | Age: 49
End: 2024-11-26
Payer: COMMERCIAL

## 2024-12-12 ENCOUNTER — TRANSFERRED RECORDS (OUTPATIENT)
Dept: HEALTH INFORMATION MANAGEMENT | Facility: CLINIC | Age: 49
End: 2024-12-12

## 2024-12-12 ENCOUNTER — HOSPITAL ENCOUNTER (OUTPATIENT)
Dept: MAMMOGRAPHY | Facility: CLINIC | Age: 49
Discharge: HOME | End: 2024-12-12
Payer: COMMERCIAL

## 2024-12-12 DIAGNOSIS — Z12.31 VISIT FOR SCREENING MAMMOGRAM: ICD-10-CM

## 2024-12-12 PROCEDURE — 77063 BREAST TOMOSYNTHESIS BI: CPT

## 2024-12-12 PROCEDURE — 77067 SCR MAMMO BI INCL CAD: CPT

## 2024-12-13 ENCOUNTER — TRANSFERRED RECORDS (OUTPATIENT)
Dept: HEALTH INFORMATION MANAGEMENT | Facility: CLINIC | Age: 49
End: 2024-12-13
Payer: COMMERCIAL

## 2024-12-30 ENCOUNTER — TRANSFERRED RECORDS (OUTPATIENT)
Dept: HEALTH INFORMATION MANAGEMENT | Facility: CLINIC | Age: 49
End: 2024-12-30
Payer: COMMERCIAL

## 2025-02-06 ENCOUNTER — TRANSFERRED RECORDS (OUTPATIENT)
Dept: HEALTH INFORMATION MANAGEMENT | Facility: CLINIC | Age: 50
End: 2025-02-06
Payer: COMMERCIAL

## 2025-02-13 ENCOUNTER — TRANSFERRED RECORDS (OUTPATIENT)
Dept: HEALTH INFORMATION MANAGEMENT | Facility: CLINIC | Age: 50
End: 2025-02-13
Payer: COMMERCIAL

## 2025-03-03 ENCOUNTER — TRANSFERRED RECORDS (OUTPATIENT)
Dept: HEALTH INFORMATION MANAGEMENT | Facility: CLINIC | Age: 50
End: 2025-03-03

## 2025-03-13 ENCOUNTER — TRANSFERRED RECORDS (OUTPATIENT)
Dept: HEALTH INFORMATION MANAGEMENT | Facility: CLINIC | Age: 50
End: 2025-03-13
Payer: COMMERCIAL

## 2025-06-13 ENCOUNTER — HOSPITAL ENCOUNTER (OUTPATIENT)
Dept: MRI IMAGING | Facility: CLINIC | Age: 50
Discharge: HOME | End: 2025-06-13
Attending: INTERNAL MEDICINE | Admitting: INTERNAL MEDICINE
Payer: COMMERCIAL

## 2025-06-13 DIAGNOSIS — R92.333 HETEROGENEOUSLY DENSE TISSUE OF BOTH BREASTS ON MAMMOGRAPHY: ICD-10-CM

## 2025-06-13 DIAGNOSIS — C50.919 BREAST CANCER, FEMALE (H): ICD-10-CM

## 2025-06-13 PROCEDURE — 77049 MRI BREAST C-+ W/CAD BI: CPT

## 2025-06-13 PROCEDURE — A9585 GADOBUTROL INJECTION: HCPCS | Performed by: INTERNAL MEDICINE

## 2025-06-13 PROCEDURE — 255N000002 HC RX 255 OP 636: Performed by: INTERNAL MEDICINE

## 2025-06-13 RX ORDER — GADOBUTROL 604.72 MG/ML
7.5 INJECTION INTRAVENOUS ONCE
Status: COMPLETED | OUTPATIENT
Start: 2025-06-13 | End: 2025-06-13

## 2025-06-13 RX ADMIN — GADOBUTROL 7.5 ML: 604.72 INJECTION INTRAVENOUS at 13:28

## 2025-06-14 ENCOUNTER — HEALTH MAINTENANCE LETTER (OUTPATIENT)
Age: 50
End: 2025-06-14

## 2025-07-14 ENCOUNTER — TRANSFERRED RECORDS (OUTPATIENT)
Dept: HEALTH INFORMATION MANAGEMENT | Facility: CLINIC | Age: 50
End: 2025-07-14
Payer: COMMERCIAL

## 2025-07-21 ENCOUNTER — TRANSFERRED RECORDS (OUTPATIENT)
Dept: HEALTH INFORMATION MANAGEMENT | Facility: CLINIC | Age: 50
End: 2025-07-21
Payer: COMMERCIAL

## (undated) DEVICE — LINEN FULL SHEET 5511

## (undated) DEVICE — LINEN HALF SHEET 5512

## (undated) DEVICE — SYR 05ML LL W/O NDL

## (undated) DEVICE — ADH SKIN CLOSURE PREMIERPRO EXOFIN MICOR HV 0.5ML 3471

## (undated) DEVICE — LINEN TOWEL PACK X10 5473

## (undated) DEVICE — SU VICRYL 4-0 PS-2 18" UND J496H

## (undated) DEVICE — GLOVE PROTEXIS BLUE W/NEU-THERA 6.5  2D73EB65

## (undated) DEVICE — DRAPE LAP W/ARMBOARD 29410

## (undated) DEVICE — ESU GROUND PAD ADULT W/CORD E7507

## (undated) DEVICE — PACK MINOR CUSTOM RIDGES SBA32RMRMA

## (undated) DEVICE — SOL NACL 0.9% IRRIG 1000ML BOTTLE 2F7124

## (undated) DEVICE — GLOVE PROTEXIS POWDER FREE 7.5 ORTHOPEDIC 2D73ET75

## (undated) DEVICE — NDL 22GA 1.5"

## (undated) DEVICE — LINEN TOWEL PACK X5 5464

## (undated) DEVICE — GLOVE PROTEXIS POWDER FREE SMT 7.5  2D72PT75X

## (undated) DEVICE — BAG CLEAR TRASH 1.3M 39X33" P4040C

## (undated) DEVICE — DRAPE LAP TRANSVERSE 29421

## (undated) DEVICE — SU VICRYL 3-0 SH 27" UND J416H

## (undated) DEVICE — SUCTION MANIFOLD NEPTUNE 2 SYS 4 PORT 0702-020-000

## (undated) DEVICE — SYR BULB IRRIG 50ML LATEX FREE 0035280

## (undated) DEVICE — SLEEVE PROTECTIVE BREAST BIOPSY  GMSLV001-10

## (undated) DEVICE — SU SILK 3-0 SH 30" K832H

## (undated) DEVICE — CLIP ETHICON LIGACLIP SM BLUE LT100

## (undated) DEVICE — SUCTION CANISTER MEDIVAC LINER 3000ML W/LID 65651-530

## (undated) DEVICE — PREP CHLORAPREP 26ML TINTED HI-LITE ORANGE 930815

## (undated) DEVICE — TUBING SUCTION 12"X1/4" N612

## (undated) DEVICE — GLOVE PROTEXIS BLUE W/NEU-THERA 8.0  2D73EB80

## (undated) DEVICE — Device

## (undated) DEVICE — PAD CHUX UNDERPAD 23X24" 7136

## (undated) DEVICE — GLOVE PROTEXIS W/NEU-THERA 6.5  2D73TE65

## (undated) RX ORDER — FENTANYL CITRATE 50 UG/ML
INJECTION, SOLUTION INTRAMUSCULAR; INTRAVENOUS
Status: DISPENSED
Start: 2021-05-13

## (undated) RX ORDER — CEFAZOLIN SODIUM 2 G/100ML
INJECTION, SOLUTION INTRAVENOUS
Status: DISPENSED
Start: 2021-04-28

## (undated) RX ORDER — LIDOCAINE HYDROCHLORIDE 10 MG/ML
INJECTION, SOLUTION EPIDURAL; INFILTRATION; INTRACAUDAL; PERINEURAL
Status: DISPENSED
Start: 2021-05-13

## (undated) RX ORDER — DIPHENHYDRAMINE HYDROCHLORIDE 50 MG/ML
INJECTION INTRAMUSCULAR; INTRAVENOUS
Status: DISPENSED
Start: 2021-04-28

## (undated) RX ORDER — PROPOFOL 10 MG/ML
INJECTION, EMULSION INTRAVENOUS
Status: DISPENSED
Start: 2021-04-07

## (undated) RX ORDER — DEXAMETHASONE SODIUM PHOSPHATE 4 MG/ML
INJECTION, SOLUTION INTRA-ARTICULAR; INTRALESIONAL; INTRAMUSCULAR; INTRAVENOUS; SOFT TISSUE
Status: DISPENSED
Start: 2021-05-13

## (undated) RX ORDER — BUPIVACAINE HYDROCHLORIDE 5 MG/ML
INJECTION, SOLUTION EPIDURAL; INTRACAUDAL
Status: DISPENSED
Start: 2021-04-28

## (undated) RX ORDER — LIDOCAINE HYDROCHLORIDE 10 MG/ML
INJECTION, SOLUTION EPIDURAL; INFILTRATION; INTRACAUDAL; PERINEURAL
Status: DISPENSED
Start: 2021-04-07

## (undated) RX ORDER — BUPIVACAINE HYDROCHLORIDE 5 MG/ML
INJECTION, SOLUTION EPIDURAL; INTRACAUDAL
Status: DISPENSED
Start: 2021-04-07

## (undated) RX ORDER — CEFAZOLIN SODIUM 2 G/100ML
INJECTION, SOLUTION INTRAVENOUS
Status: DISPENSED
Start: 2021-05-13

## (undated) RX ORDER — GLYCOPYRROLATE 0.2 MG/ML
INJECTION INTRAMUSCULAR; INTRAVENOUS
Status: DISPENSED
Start: 2021-04-07

## (undated) RX ORDER — KETOROLAC TROMETHAMINE 30 MG/ML
INJECTION, SOLUTION INTRAMUSCULAR; INTRAVENOUS
Status: DISPENSED
Start: 2021-04-07

## (undated) RX ORDER — FENTANYL CITRATE 50 UG/ML
INJECTION, SOLUTION INTRAMUSCULAR; INTRAVENOUS
Status: DISPENSED
Start: 2021-04-28

## (undated) RX ORDER — ONDANSETRON 2 MG/ML
INJECTION INTRAMUSCULAR; INTRAVENOUS
Status: DISPENSED
Start: 2021-04-07

## (undated) RX ORDER — FENTANYL CITRATE 50 UG/ML
INJECTION, SOLUTION INTRAMUSCULAR; INTRAVENOUS
Status: DISPENSED
Start: 2021-04-07

## (undated) RX ORDER — HYDROCODONE BITARTRATE AND ACETAMINOPHEN 5; 325 MG/1; MG/1
TABLET ORAL
Status: DISPENSED
Start: 2021-04-28

## (undated) RX ORDER — BUPIVACAINE HYDROCHLORIDE AND EPINEPHRINE 5; 5 MG/ML; UG/ML
INJECTION, SOLUTION EPIDURAL; INTRACAUDAL; PERINEURAL
Status: DISPENSED
Start: 2021-05-13

## (undated) RX ORDER — HYDROMORPHONE HYDROCHLORIDE 1 MG/ML
INJECTION, SOLUTION INTRAMUSCULAR; INTRAVENOUS; SUBCUTANEOUS
Status: DISPENSED
Start: 2021-04-28

## (undated) RX ORDER — ONDANSETRON 2 MG/ML
INJECTION INTRAMUSCULAR; INTRAVENOUS
Status: DISPENSED
Start: 2021-05-13

## (undated) RX ORDER — PROPOFOL 10 MG/ML
INJECTION, EMULSION INTRAVENOUS
Status: DISPENSED
Start: 2021-04-28

## (undated) RX ORDER — GLYCOPYRROLATE 0.2 MG/ML
INJECTION INTRAMUSCULAR; INTRAVENOUS
Status: DISPENSED
Start: 2021-05-13

## (undated) RX ORDER — DEXAMETHASONE SODIUM PHOSPHATE 4 MG/ML
INJECTION, SOLUTION INTRA-ARTICULAR; INTRALESIONAL; INTRAMUSCULAR; INTRAVENOUS; SOFT TISSUE
Status: DISPENSED
Start: 2021-04-07

## (undated) RX ORDER — CEFAZOLIN SODIUM 2 G/100ML
INJECTION, SOLUTION INTRAVENOUS
Status: DISPENSED
Start: 2021-04-07